# Patient Record
Sex: MALE | Race: OTHER | NOT HISPANIC OR LATINO
[De-identification: names, ages, dates, MRNs, and addresses within clinical notes are randomized per-mention and may not be internally consistent; named-entity substitution may affect disease eponyms.]

---

## 2017-01-03 ENCOUNTER — TRANSCRIPTION ENCOUNTER (OUTPATIENT)
Age: 82
End: 2017-01-03

## 2017-01-27 ENCOUNTER — FORM ENCOUNTER (OUTPATIENT)
Age: 82
End: 2017-01-27

## 2017-01-29 ENCOUNTER — FORM ENCOUNTER (OUTPATIENT)
Age: 82
End: 2017-01-29

## 2017-01-31 ENCOUNTER — NON-APPOINTMENT (OUTPATIENT)
Age: 82
End: 2017-01-31

## 2017-01-31 ENCOUNTER — APPOINTMENT (OUTPATIENT)
Dept: ELECTROPHYSIOLOGY | Facility: CLINIC | Age: 82
End: 2017-01-31

## 2017-01-31 VITALS
HEIGHT: 67 IN | WEIGHT: 181 LBS | BODY MASS INDEX: 28.41 KG/M2 | SYSTOLIC BLOOD PRESSURE: 165 MMHG | DIASTOLIC BLOOD PRESSURE: 78 MMHG | HEART RATE: 64 BPM

## 2017-03-20 ENCOUNTER — RX RENEWAL (OUTPATIENT)
Age: 82
End: 2017-03-20

## 2017-03-23 ENCOUNTER — NON-APPOINTMENT (OUTPATIENT)
Age: 82
End: 2017-03-23

## 2017-03-23 ENCOUNTER — APPOINTMENT (OUTPATIENT)
Dept: CARDIOLOGY | Facility: CLINIC | Age: 82
End: 2017-03-23

## 2017-03-23 VITALS
DIASTOLIC BLOOD PRESSURE: 75 MMHG | BODY MASS INDEX: 28.56 KG/M2 | WEIGHT: 182 LBS | HEART RATE: 55 BPM | OXYGEN SATURATION: 96 % | SYSTOLIC BLOOD PRESSURE: 138 MMHG | HEIGHT: 67 IN

## 2017-04-03 ENCOUNTER — RX RENEWAL (OUTPATIENT)
Age: 82
End: 2017-04-03

## 2017-07-12 ENCOUNTER — APPOINTMENT (OUTPATIENT)
Dept: CARDIOLOGY | Facility: CLINIC | Age: 82
End: 2017-07-12

## 2017-07-12 ENCOUNTER — NON-APPOINTMENT (OUTPATIENT)
Age: 82
End: 2017-07-12

## 2017-07-12 VITALS
OXYGEN SATURATION: 95 % | DIASTOLIC BLOOD PRESSURE: 74 MMHG | HEART RATE: 55 BPM | WEIGHT: 182 LBS | BODY MASS INDEX: 28.56 KG/M2 | HEIGHT: 67 IN | SYSTOLIC BLOOD PRESSURE: 147 MMHG

## 2017-07-25 ENCOUNTER — APPOINTMENT (OUTPATIENT)
Dept: ELECTROPHYSIOLOGY | Facility: CLINIC | Age: 82
End: 2017-07-25

## 2017-07-25 ENCOUNTER — NON-APPOINTMENT (OUTPATIENT)
Age: 82
End: 2017-07-25

## 2017-07-25 VITALS
WEIGHT: 182 LBS | HEIGHT: 67 IN | HEART RATE: 58 BPM | OXYGEN SATURATION: 97 % | DIASTOLIC BLOOD PRESSURE: 72 MMHG | SYSTOLIC BLOOD PRESSURE: 165 MMHG | BODY MASS INDEX: 28.56 KG/M2

## 2017-07-31 ENCOUNTER — NON-APPOINTMENT (OUTPATIENT)
Age: 82
End: 2017-07-31

## 2017-07-31 ENCOUNTER — MEDICATION RENEWAL (OUTPATIENT)
Age: 82
End: 2017-07-31

## 2017-10-18 ENCOUNTER — APPOINTMENT (OUTPATIENT)
Dept: CARDIOLOGY | Facility: CLINIC | Age: 82
End: 2017-10-18
Payer: MEDICARE

## 2017-10-18 ENCOUNTER — NON-APPOINTMENT (OUTPATIENT)
Age: 82
End: 2017-10-18

## 2017-10-18 VITALS — HEART RATE: 54 BPM | OXYGEN SATURATION: 96 % | DIASTOLIC BLOOD PRESSURE: 70 MMHG | SYSTOLIC BLOOD PRESSURE: 149 MMHG

## 2017-10-18 PROCEDURE — 99214 OFFICE O/P EST MOD 30 MIN: CPT

## 2017-10-18 PROCEDURE — 93000 ELECTROCARDIOGRAM COMPLETE: CPT

## 2017-11-24 ENCOUNTER — RX RENEWAL (OUTPATIENT)
Age: 82
End: 2017-11-24

## 2018-01-30 ENCOUNTER — APPOINTMENT (OUTPATIENT)
Dept: ELECTROPHYSIOLOGY | Facility: CLINIC | Age: 83
End: 2018-01-30

## 2018-03-01 ENCOUNTER — NON-APPOINTMENT (OUTPATIENT)
Age: 83
End: 2018-03-01

## 2018-03-01 ENCOUNTER — APPOINTMENT (OUTPATIENT)
Dept: ELECTROPHYSIOLOGY | Facility: CLINIC | Age: 83
End: 2018-03-01
Payer: MEDICARE

## 2018-03-01 VITALS
DIASTOLIC BLOOD PRESSURE: 69 MMHG | BODY MASS INDEX: 28.72 KG/M2 | WEIGHT: 183 LBS | SYSTOLIC BLOOD PRESSURE: 128 MMHG | HEART RATE: 60 BPM | OXYGEN SATURATION: 97 % | HEIGHT: 67 IN

## 2018-03-01 DIAGNOSIS — R07.9 CHEST PAIN, UNSPECIFIED: ICD-10-CM

## 2018-03-01 PROCEDURE — 93000 ELECTROCARDIOGRAM COMPLETE: CPT

## 2018-03-01 PROCEDURE — 99214 OFFICE O/P EST MOD 30 MIN: CPT

## 2018-04-04 ENCOUNTER — RX RENEWAL (OUTPATIENT)
Age: 83
End: 2018-04-04

## 2018-04-11 ENCOUNTER — APPOINTMENT (OUTPATIENT)
Dept: CV DIAGNOSTICS | Facility: HOSPITAL | Age: 83
End: 2018-04-11

## 2018-04-18 ENCOUNTER — APPOINTMENT (OUTPATIENT)
Dept: CARDIOLOGY | Facility: CLINIC | Age: 83
End: 2018-04-18
Payer: MEDICARE

## 2018-04-18 ENCOUNTER — NON-APPOINTMENT (OUTPATIENT)
Age: 83
End: 2018-04-18

## 2018-04-18 VITALS — DIASTOLIC BLOOD PRESSURE: 65 MMHG | OXYGEN SATURATION: 96 % | HEART RATE: 78 BPM | SYSTOLIC BLOOD PRESSURE: 113 MMHG

## 2018-04-18 PROCEDURE — 93000 ELECTROCARDIOGRAM COMPLETE: CPT

## 2018-04-18 PROCEDURE — 99215 OFFICE O/P EST HI 40 MIN: CPT

## 2018-04-18 RX ORDER — VALSARTAN 160 MG/1
160 TABLET, COATED ORAL
Qty: 30 | Refills: 0 | Status: COMPLETED | COMMUNITY
Start: 2018-04-18

## 2018-05-03 RX ORDER — VALSARTAN 160 MG/1
160 TABLET, COATED ORAL
Qty: 90 | Refills: 1 | Status: ACTIVE | COMMUNITY
Start: 2018-05-02 | End: 1900-01-01

## 2018-05-25 ENCOUNTER — RX RENEWAL (OUTPATIENT)
Age: 83
End: 2018-05-25

## 2018-08-09 ENCOUNTER — OTHER (OUTPATIENT)
Age: 83
End: 2018-08-09

## 2018-08-28 ENCOUNTER — FORM ENCOUNTER (OUTPATIENT)
Age: 83
End: 2018-08-28

## 2018-08-31 ENCOUNTER — OTHER (OUTPATIENT)
Age: 83
End: 2018-08-31

## 2018-09-11 ENCOUNTER — NON-APPOINTMENT (OUTPATIENT)
Age: 83
End: 2018-09-11

## 2018-09-11 ENCOUNTER — APPOINTMENT (OUTPATIENT)
Dept: ELECTROPHYSIOLOGY | Facility: CLINIC | Age: 83
End: 2018-09-11
Payer: MEDICARE

## 2018-09-11 ENCOUNTER — APPOINTMENT (OUTPATIENT)
Dept: CV DIAGNOSITCS | Facility: HOSPITAL | Age: 83
End: 2018-09-11

## 2018-09-11 ENCOUNTER — OUTPATIENT (OUTPATIENT)
Dept: OUTPATIENT SERVICES | Facility: HOSPITAL | Age: 83
LOS: 1 days | End: 2018-09-11
Payer: MEDICARE

## 2018-09-11 VITALS
WEIGHT: 181 LBS | OXYGEN SATURATION: 98 % | BODY MASS INDEX: 28.35 KG/M2 | HEART RATE: 60 BPM | SYSTOLIC BLOOD PRESSURE: 121 MMHG | DIASTOLIC BLOOD PRESSURE: 62 MMHG

## 2018-09-11 DIAGNOSIS — I49.3 VENTRICULAR PREMATURE DEPOLARIZATION: ICD-10-CM

## 2018-09-11 DIAGNOSIS — I49.5 SICK SINUS SYNDROME: ICD-10-CM

## 2018-09-11 PROCEDURE — 93306 TTE W/DOPPLER COMPLETE: CPT

## 2018-09-11 PROCEDURE — 93306 TTE W/DOPPLER COMPLETE: CPT | Mod: 26

## 2018-09-11 PROCEDURE — 99213 OFFICE O/P EST LOW 20 MIN: CPT

## 2018-09-11 PROCEDURE — 93000 ELECTROCARDIOGRAM COMPLETE: CPT

## 2018-09-25 ENCOUNTER — OTHER (OUTPATIENT)
Age: 83
End: 2018-09-25

## 2018-10-10 ENCOUNTER — APPOINTMENT (OUTPATIENT)
Dept: CARDIOLOGY | Facility: CLINIC | Age: 83
End: 2018-10-10
Payer: MEDICARE

## 2018-10-10 ENCOUNTER — NON-APPOINTMENT (OUTPATIENT)
Age: 83
End: 2018-10-10

## 2018-10-10 VITALS — OXYGEN SATURATION: 96 % | HEART RATE: 68 BPM | DIASTOLIC BLOOD PRESSURE: 77 MMHG | SYSTOLIC BLOOD PRESSURE: 135 MMHG

## 2018-10-10 PROCEDURE — 99214 OFFICE O/P EST MOD 30 MIN: CPT

## 2018-10-10 PROCEDURE — 93000 ELECTROCARDIOGRAM COMPLETE: CPT

## 2018-11-23 ENCOUNTER — RX RENEWAL (OUTPATIENT)
Age: 83
End: 2018-11-23

## 2018-12-13 ENCOUNTER — APPOINTMENT (OUTPATIENT)
Dept: CARDIOLOGY | Facility: CLINIC | Age: 83
End: 2018-12-13

## 2018-12-14 ENCOUNTER — RX RENEWAL (OUTPATIENT)
Age: 83
End: 2018-12-14

## 2019-01-01 ENCOUNTER — NON-APPOINTMENT (OUTPATIENT)
Age: 84
End: 2019-01-01

## 2019-01-01 ENCOUNTER — APPOINTMENT (OUTPATIENT)
Dept: CARDIOLOGY | Facility: CLINIC | Age: 84
End: 2019-01-01
Payer: MEDICARE

## 2019-01-01 ENCOUNTER — OUTPATIENT (OUTPATIENT)
Dept: OUTPATIENT SERVICES | Facility: HOSPITAL | Age: 84
LOS: 1 days | End: 2019-01-01
Payer: MEDICARE

## 2019-01-01 ENCOUNTER — APPOINTMENT (OUTPATIENT)
Dept: CT IMAGING | Facility: CLINIC | Age: 84
End: 2019-01-01
Payer: MEDICARE

## 2019-01-01 ENCOUNTER — RX RENEWAL (OUTPATIENT)
Age: 84
End: 2019-01-01

## 2019-01-01 ENCOUNTER — FORM ENCOUNTER (OUTPATIENT)
Age: 84
End: 2019-01-01

## 2019-01-01 ENCOUNTER — APPOINTMENT (OUTPATIENT)
Dept: PULMONOLOGY | Facility: CLINIC | Age: 84
End: 2019-01-01
Payer: MEDICARE

## 2019-01-01 ENCOUNTER — RESULT REVIEW (OUTPATIENT)
Age: 84
End: 2019-01-01

## 2019-01-01 ENCOUNTER — TRANSCRIPTION ENCOUNTER (OUTPATIENT)
Age: 84
End: 2019-01-01

## 2019-01-01 ENCOUNTER — APPOINTMENT (OUTPATIENT)
Dept: THORACIC SURGERY | Facility: HOSPITAL | Age: 84
End: 2019-01-01

## 2019-01-01 ENCOUNTER — APPOINTMENT (OUTPATIENT)
Dept: THORACIC SURGERY | Facility: CLINIC | Age: 84
End: 2019-01-01
Payer: MEDICARE

## 2019-01-01 ENCOUNTER — OTHER (OUTPATIENT)
Age: 84
End: 2019-01-01

## 2019-01-01 ENCOUNTER — OUTPATIENT (OUTPATIENT)
Dept: OUTPATIENT SERVICES | Facility: HOSPITAL | Age: 84
LOS: 1 days | Discharge: HOME | End: 2019-01-01
Payer: MEDICARE

## 2019-01-01 VITALS
BODY MASS INDEX: 28.68 KG/M2 | TEMPERATURE: 97.7 F | HEART RATE: 76 BPM | WEIGHT: 168 LBS | SYSTOLIC BLOOD PRESSURE: 139 MMHG | DIASTOLIC BLOOD PRESSURE: 79 MMHG | HEIGHT: 64 IN

## 2019-01-01 VITALS
OXYGEN SATURATION: 97 % | DIASTOLIC BLOOD PRESSURE: 76 MMHG | TEMPERATURE: 97 F | HEIGHT: 64 IN | SYSTOLIC BLOOD PRESSURE: 136 MMHG | RESPIRATION RATE: 16 BRPM | HEART RATE: 78 BPM | WEIGHT: 166.01 LBS

## 2019-01-01 VITALS
SYSTOLIC BLOOD PRESSURE: 103 MMHG | RESPIRATION RATE: 16 BRPM | DIASTOLIC BLOOD PRESSURE: 73 MMHG | HEART RATE: 56 BPM | OXYGEN SATURATION: 96 %

## 2019-01-01 VITALS
OXYGEN SATURATION: 93 % | SYSTOLIC BLOOD PRESSURE: 100 MMHG | RESPIRATION RATE: 14 BRPM | DIASTOLIC BLOOD PRESSURE: 60 MMHG | BODY MASS INDEX: 29.19 KG/M2 | HEIGHT: 64 IN | HEART RATE: 78 BPM | WEIGHT: 171 LBS

## 2019-01-01 VITALS
HEIGHT: 62 IN | DIASTOLIC BLOOD PRESSURE: 60 MMHG | OXYGEN SATURATION: 95 % | BODY MASS INDEX: 29.81 KG/M2 | SYSTOLIC BLOOD PRESSURE: 110 MMHG | RESPIRATION RATE: 17 BRPM | HEART RATE: 77 BPM | WEIGHT: 162 LBS

## 2019-01-01 VITALS
SYSTOLIC BLOOD PRESSURE: 143 MMHG | HEIGHT: 64 IN | HEART RATE: 68 BPM | DIASTOLIC BLOOD PRESSURE: 80 MMHG | OXYGEN SATURATION: 96 %

## 2019-01-01 VITALS
WEIGHT: 171 LBS | HEIGHT: 64 IN | DIASTOLIC BLOOD PRESSURE: 80 MMHG | OXYGEN SATURATION: 99 % | BODY MASS INDEX: 29.19 KG/M2 | SYSTOLIC BLOOD PRESSURE: 143 MMHG | HEART RATE: 70 BPM

## 2019-01-01 DIAGNOSIS — Z95.1 PRESENCE OF AORTOCORONARY BYPASS GRAFT: Chronic | ICD-10-CM

## 2019-01-01 DIAGNOSIS — J90 PLEURAL EFFUSION, NOT ELSEWHERE CLASSIFIED: ICD-10-CM

## 2019-01-01 DIAGNOSIS — Z98.890 OTHER SPECIFIED POSTPROCEDURAL STATES: Chronic | ICD-10-CM

## 2019-01-01 DIAGNOSIS — Z00.8 ENCOUNTER FOR OTHER GENERAL EXAMINATION: ICD-10-CM

## 2019-01-01 DIAGNOSIS — R22.2 LOCALIZED SWELLING, MASS AND LUMP, TRUNK: ICD-10-CM

## 2019-01-01 DIAGNOSIS — E78.5 HYPERLIPIDEMIA, UNSPECIFIED: ICD-10-CM

## 2019-01-01 DIAGNOSIS — R06.09 OTHER FORMS OF DYSPNEA: ICD-10-CM

## 2019-01-01 DIAGNOSIS — I47.2 VENTRICULAR TACHYCARDIA: ICD-10-CM

## 2019-01-01 PROCEDURE — 99214 OFFICE O/P EST MOD 30 MIN: CPT

## 2019-01-01 PROCEDURE — 99215 OFFICE O/P EST HI 40 MIN: CPT | Mod: 25

## 2019-01-01 PROCEDURE — 99213 OFFICE O/P EST LOW 20 MIN: CPT

## 2019-01-01 PROCEDURE — 71250 CT THORAX DX C-: CPT | Mod: 26

## 2019-01-01 PROCEDURE — 94010 BREATHING CAPACITY TEST: CPT

## 2019-01-01 PROCEDURE — 71250 CT THORAX DX C-: CPT

## 2019-01-01 PROCEDURE — 99214 OFFICE O/P EST MOD 30 MIN: CPT | Mod: 25

## 2019-01-01 PROCEDURE — 31623 DX BRONCHOSCOPE/BRUSH: CPT

## 2019-01-01 PROCEDURE — 88112 CYTOPATH CELL ENHANCE TECH: CPT | Mod: 26

## 2019-01-01 PROCEDURE — 93000 ELECTROCARDIOGRAM COMPLETE: CPT

## 2019-01-01 PROCEDURE — 88305 TISSUE EXAM BY PATHOLOGIST: CPT | Mod: 26

## 2019-01-01 RX ORDER — DESLORATADINE 5 MG/1
5 TABLET ORAL DAILY
Qty: 90 | Refills: 1 | Status: ACTIVE | COMMUNITY
Start: 2019-01-01 | End: 1900-01-01

## 2019-01-01 RX ORDER — ALBUTEROL SULFATE 90 UG/1
108 (90 BASE) AEROSOL, METERED RESPIRATORY (INHALATION) EVERY 6 HOURS
Qty: 3 | Refills: 1 | Status: ACTIVE | COMMUNITY
Start: 2019-01-01 | End: 1900-01-01

## 2019-01-01 RX ORDER — FLUTICASONE FUROATE, UMECLIDINIUM BROMIDE AND VILANTEROL TRIFENATATE 100; 62.5; 25 UG/1; UG/1; UG/1
100-62.5-25 POWDER RESPIRATORY (INHALATION)
Qty: 3 | Refills: 1 | Status: ACTIVE | COMMUNITY
Start: 2019-01-15 | End: 1900-01-01

## 2019-01-01 RX ADMIN — SODIUM CHLORIDE 30 MILLILITER(S): 9 INJECTION, SOLUTION INTRAVENOUS at 07:09

## 2019-01-03 ENCOUNTER — NON-APPOINTMENT (OUTPATIENT)
Age: 84
End: 2019-01-03

## 2019-01-03 ENCOUNTER — OUTPATIENT (OUTPATIENT)
Dept: OUTPATIENT SERVICES | Facility: HOSPITAL | Age: 84
LOS: 1 days | End: 2019-01-03
Payer: MEDICARE

## 2019-01-03 ENCOUNTER — APPOINTMENT (OUTPATIENT)
Dept: CARDIOLOGY | Facility: CLINIC | Age: 84
End: 2019-01-03
Payer: MEDICARE

## 2019-01-03 VITALS
WEIGHT: 170 LBS | OXYGEN SATURATION: 96 % | DIASTOLIC BLOOD PRESSURE: 84 MMHG | SYSTOLIC BLOOD PRESSURE: 145 MMHG | HEIGHT: 67 IN | HEART RATE: 70 BPM | BODY MASS INDEX: 26.68 KG/M2

## 2019-01-03 DIAGNOSIS — I25.10 ATHEROSCLEROTIC HEART DISEASE OF NATIVE CORONARY ARTERY WITHOUT ANGINA PECTORIS: ICD-10-CM

## 2019-01-03 DIAGNOSIS — I10 ESSENTIAL (PRIMARY) HYPERTENSION: ICD-10-CM

## 2019-01-03 DIAGNOSIS — R06.09 OTHER FORMS OF DYSPNEA: ICD-10-CM

## 2019-01-03 PROCEDURE — 71046 X-RAY EXAM CHEST 2 VIEWS: CPT

## 2019-01-03 PROCEDURE — 93000 ELECTROCARDIOGRAM COMPLETE: CPT

## 2019-01-03 PROCEDURE — 99214 OFFICE O/P EST MOD 30 MIN: CPT

## 2019-01-03 PROCEDURE — 71046 X-RAY EXAM CHEST 2 VIEWS: CPT | Mod: 26

## 2019-01-04 ENCOUNTER — OTHER (OUTPATIENT)
Age: 84
End: 2019-01-04

## 2019-01-04 LAB
ALBUMIN SERPL ELPH-MCNC: 3.4 G/DL
ALP BLD-CCNC: 124 U/L
ALT SERPL-CCNC: 27 U/L
ANION GAP SERPL CALC-SCNC: 11 MMOL/L
AST SERPL-CCNC: 23 U/L
BILIRUB SERPL-MCNC: 0.5 MG/DL
BUN SERPL-MCNC: 23 MG/DL
CALCIUM SERPL-MCNC: 9.5 MG/DL
CHLORIDE SERPL-SCNC: 104 MMOL/L
CO2 SERPL-SCNC: 30 MMOL/L
CREAT SERPL-MCNC: 1.42 MG/DL
GLUCOSE SERPL-MCNC: 101 MG/DL
NT-PROBNP SERPL-MCNC: 2962 PG/ML
POTASSIUM SERPL-SCNC: 4.7 MMOL/L
PROT SERPL-MCNC: 6.7 G/DL
SODIUM SERPL-SCNC: 145 MMOL/L

## 2019-01-06 NOTE — PHYSICAL EXAM
[General Appearance - Well Developed] : well developed [Normal Appearance] : normal appearance [Well Groomed] : well groomed [General Appearance - Well Nourished] : well nourished [No Deformities] : no deformities [General Appearance - In No Acute Distress] : no acute distress [Normal Conjunctiva] : the conjunctiva exhibited no abnormalities [Eyelids - No Xanthelasma] : the eyelids demonstrated no xanthelasmas [Normal Oral Mucosa] : normal oral mucosa [No Oral Pallor] : no oral pallor [No Oral Cyanosis] : no oral cyanosis [Normal Jugular Venous A Waves Present] : normal jugular venous A waves present [Normal Jugular Venous V Waves Present] : normal jugular venous V waves present [No Jugular Venous Luna A Waves] : no jugular venous luna A waves [Respiration, Rhythm And Depth] : normal respiratory rhythm and effort [Heart Rate And Rhythm] : heart rate and rhythm were normal [Heart Sounds] : normal S1 and S2 [Murmurs] : no murmurs present [Edema] : no peripheral edema present [Regular-Premature Beats] : the rhythm was regular with premature beats [Abdomen Soft] : soft [Abdomen Tenderness] : non-tender [Abdomen Mass (___ Cm)] : no abdominal mass palpated [Abnormal Walk] : normal gait [Nail Clubbing] : no clubbing of the fingernails [Cyanosis, Localized] : no localized cyanosis [Petechial Hemorrhages (___cm)] : no petechial hemorrhages [Skin Color & Pigmentation] : normal skin color and pigmentation [] : no rash [No Venous Stasis] : no venous stasis [Skin Lesions] : no skin lesions [No Skin Ulcers] : no skin ulcer [No Xanthoma] : no  xanthoma was observed [Oriented To Time, Place, And Person] : oriented to person, place, and time [Affect] : the affect was normal [Mood] : the mood was normal [No Anxiety] : not feeling anxious [FreeTextEntry1] : trace edema

## 2019-01-06 NOTE — REVIEW OF SYSTEMS
[Feeling Fatigued] : feeling fatigued [Joint Pain] : joint pain [Joint Stiffness] : joint stiffness [Muscle Cramps] : muscle cramps [Shortness Of Breath] : no shortness of breath [Dyspnea on exertion] : not dyspnea during exertion [Lower Ext Edema] : no extremity edema [Palpitations] : no palpitations

## 2019-01-06 NOTE — HISTORY OF PRESENT ILLNESS
[FreeTextEntry1] : Misael is returning after taking diuretics for an additional week (week of Christmas)\par Now returns with continued BAGLEY\par No CP\par No LE edema\par (+) orthopnea\par No palpitations\par No syncope\par No fever\par

## 2019-01-06 NOTE — REASON FOR VISIT
[Follow-Up - Clinic] : a clinic follow-up of [Coronary Artery Disease] : coronary artery disease [Hyperlipidemia] : hyperlipidemia [Hypertension] : hypertension [Medication Management] : Medication management [Supraventricular Tachycardia] : supraventricular tachycardia [Ventricular Tachycardia] : ventricular tachycardia

## 2019-01-08 ENCOUNTER — OUTPATIENT (OUTPATIENT)
Dept: OUTPATIENT SERVICES | Facility: HOSPITAL | Age: 84
LOS: 1 days | Discharge: HOME | End: 2019-01-08
Payer: MEDICARE

## 2019-01-08 DIAGNOSIS — I10 ESSENTIAL (PRIMARY) HYPERTENSION: ICD-10-CM

## 2019-01-08 DIAGNOSIS — I25.10 ATHEROSCLEROTIC HEART DISEASE OF NATIVE CORONARY ARTERY WITHOUT ANGINA PECTORIS: ICD-10-CM

## 2019-01-08 DIAGNOSIS — R06.09 OTHER FORMS OF DYSPNEA: ICD-10-CM

## 2019-01-08 PROCEDURE — 93306 TTE W/DOPPLER COMPLETE: CPT | Mod: 26

## 2019-01-14 ENCOUNTER — APPOINTMENT (OUTPATIENT)
Dept: CV DIAGNOSITCS | Facility: HOSPITAL | Age: 84
End: 2019-01-14

## 2019-01-15 ENCOUNTER — APPOINTMENT (OUTPATIENT)
Dept: PULMONOLOGY | Facility: CLINIC | Age: 84
End: 2019-01-15
Payer: MEDICARE

## 2019-01-15 ENCOUNTER — APPOINTMENT (OUTPATIENT)
Dept: CT IMAGING | Facility: CLINIC | Age: 84
End: 2019-01-15

## 2019-01-15 ENCOUNTER — NON-APPOINTMENT (OUTPATIENT)
Age: 84
End: 2019-01-15

## 2019-01-15 VITALS
RESPIRATION RATE: 17 BRPM | WEIGHT: 171 LBS | HEART RATE: 79 BPM | DIASTOLIC BLOOD PRESSURE: 70 MMHG | OXYGEN SATURATION: 97 % | SYSTOLIC BLOOD PRESSURE: 130 MMHG | BODY MASS INDEX: 30.3 KG/M2 | HEIGHT: 63 IN

## 2019-01-15 DIAGNOSIS — Z87.891 PERSONAL HISTORY OF NICOTINE DEPENDENCE: ICD-10-CM

## 2019-01-15 PROCEDURE — 94727 GAS DIL/WSHOT DETER LNG VOL: CPT

## 2019-01-15 PROCEDURE — 94618 PULMONARY STRESS TESTING: CPT

## 2019-01-15 PROCEDURE — 99205 OFFICE O/P NEW HI 60 MIN: CPT | Mod: 25

## 2019-01-15 PROCEDURE — 71046 X-RAY EXAM CHEST 2 VIEWS: CPT

## 2019-01-15 PROCEDURE — 94060 EVALUATION OF WHEEZING: CPT | Mod: 59

## 2019-01-15 PROCEDURE — 94729 DIFFUSING CAPACITY: CPT

## 2019-01-15 RX ORDER — AZELASTINE HYDROCHLORIDE 205.5 UG/1
0.15 SPRAY, METERED NASAL TWICE DAILY
Qty: 3 | Refills: 1 | Status: ACTIVE | COMMUNITY
Start: 2019-01-15 | End: 1900-01-01

## 2019-01-15 NOTE — ADDENDUM
[FreeTextEntry1] : Documented by Luis Moore acting as a scribe for Dr. Simeon Jarquin on 1/15/2019\par \par All medical record entries made by the Scribe were at my, Dr. Simeon Jarquin's, direction and personally dictated by me on 1/15/2019. I have reviewed the chart and agree that the record accurately reflects my personal performance of the history, physical exam, assessment and plan. I have also personally directed, reviewed, and agree with the discharge instructions. \par \par \par \par \par

## 2019-01-15 NOTE — PHYSICAL EXAM
[General Appearance - Well Developed] : well developed [Normal Appearance] : normal appearance [Well Groomed] : well groomed [General Appearance - Well Nourished] : well nourished [No Deformities] : no deformities [General Appearance - In No Acute Distress] : no acute distress [Normal Conjunctiva] : the conjunctiva exhibited no abnormalities [Eyelids - No Xanthelasma] : the eyelids demonstrated no xanthelasmas [Normal Oropharynx] : normal oropharynx [Neck Appearance] : the appearance of the neck was normal [Neck Cervical Mass (___cm)] : no neck mass was observed [Jugular Venous Distention Increased] : there was no jugular-venous distention [Thyroid Diffuse Enlargement] : the thyroid was not enlarged [Thyroid Nodule] : there were no palpable thyroid nodules [Heart Rate And Rhythm] : heart rate and rhythm were normal [Heart Sounds] : normal S1 and S2 [Murmurs] : no murmurs present [Respiration, Rhythm And Depth] : normal respiratory rhythm and effort [Exaggerated Use Of Accessory Muscles For Inspiration] : no accessory muscle use [Abdomen Soft] : soft [Abdomen Tenderness] : non-tender [Abdomen Mass (___ Cm)] : no abdominal mass palpated [Abnormal Walk] : normal gait [Gait - Sufficient For Exercise Testing] : the gait was sufficient for exercise testing [Nail Clubbing] : no clubbing of the fingernails [Cyanosis, Localized] : no localized cyanosis [Petechial Hemorrhages (___cm)] : no petechial hemorrhages [Skin Color & Pigmentation] : normal skin color and pigmentation [Skin Turgor] : normal skin turgor [] : no rash [Deep Tendon Reflexes (DTR)] : deep tendon reflexes were 2+ and symmetric [Sensation] : the sensory exam was normal to light touch and pinprick [No Focal Deficits] : no focal deficits [Oriented To Time, Place, And Person] : oriented to person, place, and time [Impaired Insight] : insight and judgment were intact [Affect] : the affect was normal [III] : III [FreeTextEntry1] : I:E 1:3, decreased breath left

## 2019-01-15 NOTE — ASSESSMENT
[FreeTextEntry1] : Ms. Nash is a 86 year old female with a prior history of  longstanding parotid tumor, CAD, s/p CABG (2007), coronary stents, HTN, NSVT, RA, HLD, and former smoker, likely COPD who now comes in for a pulmonary evaluation.\par \par The patient's SOB is felt to be multifactorial:\par - Out-of Shape\par - Poor breathing mechanics\par - Restrictive Dysfunction due to pleural effusion\par - COPD\par - Cardiac Disease \par \par Problem 1: COPD\par - Add Trelegy 1 inhalation QD\par -COPD is a progressive disease and although it can’t be cured , appropriate management can slow its progression, reduce frequency and severity of exacerbations, and improve symptoms and the patient quality of life. Hospitalizations are the greatest contributor to the total COPD costs and account for up to 87% of total COPD related costs. Exacerbations are the main cause of admissions and subsequently account for the 40-75% of COPD costs. Inhaled maintenance therapy reduces the incidence of exacerbations in patients with stable COPD. Incorrect inhaler use and nonadherence are major obstacles to achieving COPD treatment goals. Many COPD patients have challenges (impaired inhalation, limited dexterity, reduced cognition: that limit their ability to correctly use their COPD treatment devices resulting in reduced symptom control. Of most importance is smoking cessation and early intervention with respiratory illnesses and contemplation for pulmonary rehab to enhance quality of life.\par -Inhaler technique reviewed as well as oral hygiene techniques reviewed with patient. Avoidance of cold air, extremes of temperature, rescue inhaler should be used before exercise. Order of medication reviewed with patient. Recommended use of a cool mist humidifier in the bedroom. \par \par Problem 2: Abnormal PET/CT/ CXR (Left Lower Lung "Tumor" and Pleural Effusion)\par - Follow up CT scan of the chest with contrast\par - Following scan, he was referred for a CTS evaluation for biopsy of left lower lung abnormality and effusion\par - Script given for erly detection CDT lung testing \par \par Problem 3: Allergy/ PND\par - Add Astelin Nasal Spray 0.15 1 sniff up to BID\par Environmental measures for allergies were encouraged including mattress and pillow cover, air purifier, and environmental controls.\par \par Problem 4: ?OSAS\par - Due to his fatigue, snoring, elevated Mallampati class, cardiac issues, and witnessed apneic episodes, he is being recommended for a home sleep study. \par - Sleep apnea is associated with adverse clinical consequences which an affect most organ systems. Cardiovascular disease risk includes arrhythmias, atrial fibrillation, hypertension, coronary artery disease, and stroke. Metabolic disorders include diabetes type 2, non-alcoholic fatty liver disease. Mood disorder especially depression; and cognitive decline especially in the elderly. Associations with chronic reflux/Winchester’s esophagus some but not all inclusive. \par -Reasons include arousal consistent with hypopnea; respiratory events most prominent in REM sleep or supine position; therefore sleep staging and body position are important for accurate diagnosis and estimation of AHI. \par \par Problem 5: Cardiac Disease\par - Follow up with Dr. Niko Soto.\par \par Problem 6: Poor Breathing Mechanics \par - Proper breathing techniques were reviewed with an emphasis of exhalation. Patient instructed to breath in for 1 second and out for four seconds. Patient was encouraged to not talk while walking.\par \par Problem 7: Health maintenance \par -s/p flu shot \par -recommended strep pneumonia vaccines: Prevnar-13 vaccine, followed by Pneumo vaccine 23 one year following\par -recommended early intervention for URIs\par -recommended regular osteoporosis evaluations\par -recommended early dermatological evaluations\par -recommended after the age of 50 to the age of 70, colonoscopy every 5 years\par \par \par f/u in 6-8 weeks\par pt is encouraged to call or fax the office with any questions or concerns. \par Explained to the pt in full detail with demonstrations how to use the inhalers and inhaler hygiene. \par -Education provided to the PT regarding their visit and conditions.

## 2019-01-15 NOTE — HISTORY OF PRESENT ILLNESS
[FreeTextEntry1] : Ms. Nash is a 86 year old female coming into the office today for an initial evaluation. Her chief complaint is wheezing \par - He comes in stating that he has been wheezing.\par -His wife reports that he has always been coughing, wheezing, SOB\par - She states that it became an issue upon returning from a cruise. She feels that he contracted something while on the cruise\par - His daughter reports that he has been sent for multiple X-rays, which revealed a cloudiness on one of his lungs. He followed up with another pulmonologist. He had a aspiration biopsy performed, the results of all wee inconclusive with possible Hodgkins lymphoma markers\par - His subauricular mass \par - He reports SOB on exertion. His daughter states that he feels SOB upon minimal exertion. \par - His daughter reports that he recently had a significant loss of appetite and weight loss.  \par - He reports a isolated instance of SOB while on his back\par - His wife denies snoring, but notices that he wheezes\par - His wife also notes that he will sometimes gasp while sleeping. \par - His general energy level is moderate\par - A few weeks ago, he had some LE edema, which was not felt to be a blood clot. \par - His sinuses are leaky drippy, and congested on occasion\par - He  denies any headaches, nausea, vomiting, fever, chills, sweats, chest pain, chest pressure, diarrhea, constipation, dysphagia, myalgias, dizziness, leg pain, itchy eyes, itchy ears, heartburn, reflux, or sour taste in the mouth.

## 2019-01-15 NOTE — PROCEDURE
[FreeTextEntry1] : PFT- spi reveals moderate restrictive and mild obstructive dysfunction; FEV1 was 1.29L which is 56% of predicted- there was 47% improvement with bronchodilator at mid to low lung volumes; moderately reduced lung volumes; normal diffusion at 16.6, which is 106% of predicted; normal flow volume loop \par \par Patient completed a 6-minute walk/exercise study in which the Lowest Pulse Ox was 94%; there was evidence of somewhat severe dyspnea and fatigue. The patient walked  9 laps or 146.7 meters. Pt stopped as he felt weak.  \par \par CXR revealed mild cardiomegaly; moderate left pleural effusion\par \par \par CXR (1/3/2019) revealed a hazy opacity in the left lower lobe; ?PNA vs atelectasis; left pleural effusion \par \par He had a bronchoscopy performed at [facility] on 10/23/2018 (Dr. Avila), which revealed brushings, washings, lavage, FNA all negative for malignancy \par \par He had a PET/CT scan performed on 9/7/2018, which revealed FDG-avid left hilar mass; inferiorly along the left lower lobe bronchus suspicious for malignancy; low-grade, FDG avid small to moderate pleural effusion;  longstanding right 5.3 cm right parotid mass with heterogenous uptake.

## 2019-01-21 ENCOUNTER — FORM ENCOUNTER (OUTPATIENT)
Age: 84
End: 2019-01-21

## 2019-01-22 ENCOUNTER — OUTPATIENT (OUTPATIENT)
Dept: OUTPATIENT SERVICES | Facility: HOSPITAL | Age: 84
LOS: 1 days | End: 2019-01-22
Payer: MEDICARE

## 2019-01-22 ENCOUNTER — APPOINTMENT (OUTPATIENT)
Dept: CT IMAGING | Facility: CLINIC | Age: 84
End: 2019-01-22
Payer: MEDICARE

## 2019-01-22 DIAGNOSIS — Z00.8 ENCOUNTER FOR OTHER GENERAL EXAMINATION: ICD-10-CM

## 2019-01-22 PROCEDURE — 71260 CT THORAX DX C+: CPT | Mod: 26

## 2019-01-22 PROCEDURE — 71260 CT THORAX DX C+: CPT

## 2019-01-28 ENCOUNTER — APPOINTMENT (OUTPATIENT)
Dept: THORACIC SURGERY | Facility: CLINIC | Age: 84
End: 2019-01-28
Payer: MEDICARE

## 2019-01-28 VITALS
OXYGEN SATURATION: 98 % | BODY MASS INDEX: 27.47 KG/M2 | HEART RATE: 66 BPM | WEIGHT: 175 LBS | TEMPERATURE: 97.5 F | SYSTOLIC BLOOD PRESSURE: 130 MMHG | DIASTOLIC BLOOD PRESSURE: 71 MMHG | HEIGHT: 67 IN

## 2019-01-28 PROCEDURE — 99213 OFFICE O/P EST LOW 20 MIN: CPT

## 2019-01-28 NOTE — HISTORY OF PRESENT ILLNESS
[FreeTextEntry1] : Mr. Nash is an 86 year old male, former smoker, with hx of COPD found to have a PET-avid left hilar mass.  Patient complains of shortness of breath.  He is here to have the above findings evaluated.  He states his shortness of breath is progressively worsening  He denies chest pain.  He also denies any recent fever or chills.

## 2019-01-28 NOTE — PHYSICAL EXAM
[Neck Appearance] : the appearance of the neck was normal [Neck Cervical Mass (___cm)] : no neck mass was observed [] : no respiratory distress [Respiration, Rhythm And Depth] : normal respiratory rhythm and effort [Exaggerated Use Of Accessory Muscles For Inspiration] : no accessory muscle use [Examination Of The Chest] : the chest was normal in appearance [Chest Visual Inspection Thoracic Asymmetry] : no chest asymmetry [Bowel Sounds] : normal bowel sounds [Abdomen Soft] : soft [Abdomen Tenderness] : non-tender [Cervical Lymph Nodes Enlarged Posterior Bilaterally] : posterior cervical [Cervical Lymph Nodes Enlarged Anterior Bilaterally] : anterior cervical [Supraclavicular Lymph Nodes Enlarged Bilaterally] : supraclavicular [Cranial Nerves] : cranial nerves 2-12 were intact [Deep Tendon Reflexes (DTR)] : deep tendon reflexes were 2+ and symmetric [Sensation] : the sensory exam was normal to light touch and pinprick [Oriented To Time, Place, And Person] : oriented to person, place, and time

## 2019-01-28 NOTE — ASSESSMENT
[FreeTextEntry1] : Mr. Nash is has a left hilar mas with upper lobe narrowing and a loculated moderate pleural effusion.  He has a bronchoscopy performed by a pulmonologist a few months ago which was inconclusive.  The lesion is highly suspicious of carcinoma.  I have given him the option of another bronchoscopy or a diagnostic thoracentesis. I a have also offered a bronchoscopy with a VATS decortication with a pleural biopsy.  I have discussed the risks and benefits of these options in great detail.  He will discuss with his family and get back to me.

## 2019-01-28 NOTE — CONSULT LETTER
[Dear  ___] : Dear  [unfilled], [Consult Letter:] : I had the pleasure of evaluating your patient, [unfilled]. [Please see my note below.] : Please see my note below. [Consult Closing:] : Thank you very much for allowing me to participate in the care of this patient.  If you have any questions, please do not hesitate to contact me. [Sincerely,] : Sincerely, [DrWalter  ___] : Dr. ASTUDILLO [DrWalter ___] : Dr. ASTUDILLO [FreeTextEntry2] : Simeon Jarquin MD\par [address] [facility]\[place] [address]\par [phone] [FreeTextEntry3] : Jett Baker MD\par Director of Thoracic, [facility]\par Cardiovascular & Thoracic Surgery\par \par [facility] \par [address] [place] [address]\[place] [address]

## 2019-02-18 ENCOUNTER — APPOINTMENT (OUTPATIENT)
Dept: PULMONOLOGY | Facility: CLINIC | Age: 84
End: 2019-02-18
Payer: MEDICARE

## 2019-02-18 VITALS
SYSTOLIC BLOOD PRESSURE: 130 MMHG | HEART RATE: 75 BPM | WEIGHT: 172 LBS | HEIGHT: 64 IN | RESPIRATION RATE: 16 BRPM | OXYGEN SATURATION: 98 % | DIASTOLIC BLOOD PRESSURE: 60 MMHG | BODY MASS INDEX: 29.37 KG/M2

## 2019-02-18 PROCEDURE — 94060 EVALUATION OF WHEEZING: CPT

## 2019-02-18 PROCEDURE — 99214 OFFICE O/P EST MOD 30 MIN: CPT | Mod: 25

## 2019-02-18 NOTE — HISTORY OF PRESENT ILLNESS
[FreeTextEntry1] : Ms. Nash is a 86 year old female with a history of abnormal CT, COPD, BAGLEY, left pleural effusion, PND, snoring, and SOB coming into the office today for a follow up visit. Her chief complaint is pleural effusion. \par - He states that when it is damp outside he has orthopedic pain \par - His daughter reports that his heart rate has been elevated. \par - His daughter also states that his breathing has been relatively fine aside from when he is active. \par - He notes that he has been relatively constipated. \par - His wife reports that he has been having a lot of dry mouth and thirst. \par - He is eating well and his weight is stable \par - He has not been eating as much as he used to \par - In the last few months he has lost around 10 pounds. \par - He is reluctant to proceed with Dr. Baker and has expressed a desire to simply observe his condition. \par - He denies any headaches, nausea, vomiting, fever, chills, sweats, chest pain, chest pressure, palpitations, coughing, wheezing, fatigue, diarrhea, constipation, dysphagia, myalgias, dizziness, leg swelling, leg pain, itchy eyes, itchy ears, heartburn, reflux, or sour taste in the mouth. \par \par

## 2019-02-18 NOTE — ASSESSMENT
[FreeTextEntry1] : Ms. Nash is a 86 year old female with a prior history of  longstanding parotid tumor, CAD, s/p CABG (2007), coronary stents, HTN, NSVT, RA, HLD, and former smoker, COPD, and pleural disease who now comes in for a pulmonary re-evaluation.\par \par The patient's SOB is felt to be multifactorial:\par - Out-of Shape\par - Poor breathing mechanics\par - Restrictive Dysfunction due to pleural effusion\par - COPD\par - Cardiac Disease \par \par Problem 1: COPD (Improved)\par - Continue Trelegy 1 inhalation QD\par -COPD is a progressive disease and although it can’t be cured , appropriate management can slow its progression, reduce frequency and severity of exacerbations, and improve symptoms and the patient quality of life. Hospitalizations are the greatest contributor to the total COPD costs and account for up to 87% of total COPD related costs. Exacerbations are the main cause of admissions and subsequently account for the 40-75% of COPD costs. Inhaled maintenance therapy reduces the incidence of exacerbations in patients with stable COPD. Incorrect inhaler use and nonadherence are major obstacles to achieving COPD treatment goals. Many COPD patients have challenges (impaired inhalation, limited dexterity, reduced cognition: that limit their ability to correctly use their COPD treatment devices resulting in reduced symptom control. Of most importance is smoking cessation and early intervention with respiratory illnesses and contemplation for pulmonary rehab to enhance quality of life.\par -Inhaler technique reviewed as well as oral hygiene techniques reviewed with patient. Avoidance of cold air, extremes of temperature, rescue inhaler should be used before exercise. Order of medication reviewed with patient. Recommended use of a cool mist humidifier in the bedroom. \par \par Problem 2: Abnormal PET/CT/ CXR (Left Lower Lung "Tumor" and Pleural Effusion)\par - Follow up CT scan of the chest with contrast\par - Following scan, he had a CTS evaluation for biopsy of left lower lung abnormality and effusion with Dr. Baker (pt reluctant to proceed)\par - S/p Early detection CDT lung testing: Negative\par - Recommended [facility] pulmonary rehab\par -Reassess exercise limitation caused by breathlessness and fatigue and also provide a supportive environment in which patients can become active and engage in management of their health problems \par - Unclear as to how long  pleural fluid has been present and whether it will remain as it is. He will need to be observed and followed up with a CT scan in 2-3 months at which point it will be re-evaluated. He will need cardiac evaluation. \par \par Problem 3: Allergy/ PND\par - Continue Astelin Nasal Spray 0.15 1 sniff up to BID\par Environmental measures for allergies were encouraged including mattress and pillow cover, air purifier, and environmental controls.\par \par Problem 4: ?OSAS\par - Due to his fatigue, snoring, elevated Mallampati class, cardiac issues, and witnessed apneic episodes, he is being recommended for a home sleep study. \par - Sleep apnea is associated with adverse clinical consequences which an affect most organ systems. Cardiovascular disease risk includes arrhythmias, atrial fibrillation, hypertension, coronary artery disease, and stroke. Metabolic disorders include diabetes type 2, non-alcoholic fatty liver disease. Mood disorder especially depression; and cognitive decline especially in the elderly. Associations with chronic reflux/Winchester’s esophagus some but not all inclusive. \par -Reasons include arousal consistent with hypopnea; respiratory events most prominent in REM sleep or supine position; therefore sleep staging and body position are important for accurate diagnosis and estimation of AHI. \par \par Problem 5: Cardiac Disease\par - Follow up with Dr. Niko Soto.\par \par Problem 6: Poor Breathing Mechanics \par - Proper breathing techniques were reviewed with an emphasis of exhalation. Patient instructed to breath in for 1 second and out for four seconds. Patient was encouraged to not talk while walking.\par \par Problem 7: Health maintenance \par -s/p flu shot \par -recommended strep pneumonia vaccines: Prevnar-13 vaccine, followed by Pneumo vaccine 23 one year following\par -recommended early intervention for URIs\par -recommended regular osteoporosis evaluations\par -recommended early dermatological evaluations\par -recommended after the age of 50 to the age of 70, colonoscopy every 5 years\par \par \par f/u in 6-8 weeks\par pt is encouraged to call or fax the office with any questions or concerns. \par Explained to the pt in full detail with demonstrations how to use the inhalers and inhaler hygiene. \par -Education provided to the PT regarding their visit and conditions.

## 2019-02-18 NOTE — ADDENDUM
[FreeTextEntry1] : Documented by Luis Moore acting as a scribe for Dr. Simeon Jarquin on 2/18/2019\par \par All medical record entries made by the Scribe were at my, Dr. Simeon Jarquin's, direction and personally dictated by me on 2/18/2019. I have reviewed the chart and agree that the record accurately reflects my personal performance of the history, physical exam, assessment and plan. I have also personally directed, reviewed, and agree with the discharge instructions. \par \par \par \par \par

## 2019-02-18 NOTE — REASON FOR VISIT
[Follow-Up] : a follow-up visit [FreeTextEntry1] : abnormal CT, COPD, BAGLEY, left pleural effusion, PND, snoring, and SOB

## 2019-02-18 NOTE — PHYSICAL EXAM
[General Appearance - Well Developed] : well developed [Normal Appearance] : normal appearance [Well Groomed] : well groomed [General Appearance - Well Nourished] : well nourished [No Deformities] : no deformities [General Appearance - In No Acute Distress] : no acute distress [Normal Conjunctiva] : the conjunctiva exhibited no abnormalities [Eyelids - No Xanthelasma] : the eyelids demonstrated no xanthelasmas [Normal Oropharynx] : normal oropharynx [III] : III [Neck Appearance] : the appearance of the neck was normal [Neck Cervical Mass (___cm)] : no neck mass was observed [Jugular Venous Distention Increased] : there was no jugular-venous distention [Thyroid Diffuse Enlargement] : the thyroid was not enlarged [Thyroid Nodule] : there were no palpable thyroid nodules [Heart Rate And Rhythm] : heart rate and rhythm were normal [Heart Sounds] : normal S1 and S2 [Murmurs] : no murmurs present [Respiration, Rhythm And Depth] : normal respiratory rhythm and effort [Exaggerated Use Of Accessory Muscles For Inspiration] : no accessory muscle use [Abdomen Soft] : soft [Abdomen Tenderness] : non-tender [Abdomen Mass (___ Cm)] : no abdominal mass palpated [Abnormal Walk] : normal gait [Gait - Sufficient For Exercise Testing] : the gait was sufficient for exercise testing [Nail Clubbing] : no clubbing of the fingernails [Cyanosis, Localized] : no localized cyanosis [Petechial Hemorrhages (___cm)] : no petechial hemorrhages [Deep Tendon Reflexes (DTR)] : deep tendon reflexes were 2+ and symmetric [Sensation] : the sensory exam was normal to light touch and pinprick [No Focal Deficits] : no focal deficits [Oriented To Time, Place, And Person] : oriented to person, place, and time [Impaired Insight] : insight and judgment were intact [Affect] : the affect was normal [Skin Color & Pigmentation] : normal skin color and pigmentation [Skin Turgor] : normal skin turgor [] : no rash [Auscultation Breath Sounds / Voice Sounds] : lungs were clear to auscultation bilaterally [FreeTextEntry1] : I:E 1:3, clear

## 2019-02-18 NOTE — PROCEDURE
[FreeTextEntry1] : PFT- spi reveals mild to moderate restrictive dysfunction; FEV1 was 1.48L which is 64% of predicted; abnormal inspiratory limb.\par \par He had a Early CDT- Lung Test performed on 1/23/2019, which revealed no significant level of autoantibodies detected. \par \par He had a CT chest scan performed on 1/22/2019, which showed small to moderate left sided pleural effusion with basilar with left basilar adjacent atelectasis. There is partial left upper lobe collapse secondary to a mas in the left hilar/ infrahilar region approximately 1.9 cm. This appears grossly unchanged since 8/28/2018. Again seen is circumferential narrowing of the left lower lobe bronchus and partial obstruction of the left upper lobe bronchus with associated atelectasis. Overall, no change from prior. \par \par

## 2019-02-27 ENCOUNTER — MEDICATION RENEWAL (OUTPATIENT)
Age: 84
End: 2019-02-27

## 2019-02-28 ENCOUNTER — NON-APPOINTMENT (OUTPATIENT)
Age: 84
End: 2019-02-28

## 2019-02-28 ENCOUNTER — APPOINTMENT (OUTPATIENT)
Dept: CARDIOLOGY | Facility: CLINIC | Age: 84
End: 2019-02-28
Payer: MEDICARE

## 2019-02-28 VITALS
HEIGHT: 64 IN | HEART RATE: 75 BPM | DIASTOLIC BLOOD PRESSURE: 73 MMHG | SYSTOLIC BLOOD PRESSURE: 126 MMHG | OXYGEN SATURATION: 94 % | BODY MASS INDEX: 29.37 KG/M2 | WEIGHT: 172 LBS

## 2019-02-28 PROCEDURE — 93000 ELECTROCARDIOGRAM COMPLETE: CPT

## 2019-02-28 PROCEDURE — 99214 OFFICE O/P EST MOD 30 MIN: CPT

## 2019-03-01 ENCOUNTER — MEDICATION RENEWAL (OUTPATIENT)
Age: 84
End: 2019-03-01

## 2019-03-03 NOTE — HISTORY OF PRESENT ILLNESS
[FreeTextEntry1] : Misael is returning for f/u\par Dyspnea improved on imhalers\par \par No CP\par No LE edema\par No palpitations\par No syncope\par No fever\par

## 2019-03-08 ENCOUNTER — MEDICATION RENEWAL (OUTPATIENT)
Age: 84
End: 2019-03-08

## 2019-03-13 ENCOUNTER — OTHER (OUTPATIENT)
Age: 84
End: 2019-03-13

## 2019-03-13 RX ORDER — FUROSEMIDE 40 MG/1
40 TABLET ORAL
Refills: 5 | Status: ACTIVE | COMMUNITY
Start: 2019-01-04

## 2019-03-25 ENCOUNTER — APPOINTMENT (OUTPATIENT)
Dept: THORACIC SURGERY | Facility: CLINIC | Age: 84
End: 2019-03-25
Payer: MEDICARE

## 2019-03-25 VITALS
SYSTOLIC BLOOD PRESSURE: 125 MMHG | HEART RATE: 89 BPM | HEIGHT: 64 IN | WEIGHT: 172 LBS | DIASTOLIC BLOOD PRESSURE: 77 MMHG | BODY MASS INDEX: 29.37 KG/M2 | TEMPERATURE: 97.9 F

## 2019-03-25 PROCEDURE — 99213 OFFICE O/P EST LOW 20 MIN: CPT

## 2019-03-26 NOTE — CONSULT LETTER
[Dear  ___] : Dear  [unfilled], [Courtesy Letter:] : I had the pleasure of seeing your patient, [unfilled], in my office today. [Please see my note below.] : Please see my note below. [Consult Closing:] : Thank you very much for allowing me to participate in the care of this patient.  If you have any questions, please do not hesitate to contact me. [Sincerely,] : Sincerely, [FreeTextEntry2] : Simeon Jarquin MD [FreeTextEntry3] : Jett Baker MD\par Director of Thoracic, [facility]\par Cardiovascular & Thoracic Surgery\par \par [facility] \par [address] [place] [address]\[place] [address]

## 2019-03-26 NOTE — ASSESSMENT
[FreeTextEntry1] : Mr. Nash has abnormal findings on his cat scan of the chest.  Given his extensive medical history, the patient wanted to proceed with the least invasive method to help decipher a diagnosis.  Given those restrictions, I am recommending a Bronchoscopy.  He has had a previous bronchoscopy which was nondiagnostic.  I would try a repeat bronchoscopy to see if a diagnosis can be made.  He will be scheduled in one to two weeks.

## 2019-03-26 NOTE — HISTORY OF PRESENT ILLNESS
[FreeTextEntry1] : Mr. Nash is an 86 year old male, former smoker, with hx of COPD found to have a left hilar mas with upper lobe narrowing and a loculated moderate pleural effusion. He has a bronchoscopy performed by a pulmonologist a few months ago which was inconclusive. The lesion is highly suspicious of carcinoma. I have given him the option of another bronchoscopy or a diagnostic thoracentesis. I have also offered a bronchoscopy with a VATS decortication with a pleural biopsy. Patient was reluctant to proceed and he now presents to discuss options further.

## 2019-04-03 ENCOUNTER — OUTPATIENT (OUTPATIENT)
Dept: OUTPATIENT SERVICES | Facility: HOSPITAL | Age: 84
LOS: 1 days | End: 2019-04-03

## 2019-04-03 VITALS
HEART RATE: 71 BPM | RESPIRATION RATE: 16 BRPM | DIASTOLIC BLOOD PRESSURE: 70 MMHG | WEIGHT: 166.01 LBS | TEMPERATURE: 98 F | HEIGHT: 64 IN | OXYGEN SATURATION: 97 % | SYSTOLIC BLOOD PRESSURE: 140 MMHG

## 2019-04-03 DIAGNOSIS — R22.2 LOCALIZED SWELLING, MASS AND LUMP, TRUNK: ICD-10-CM

## 2019-04-03 DIAGNOSIS — I49.9 CARDIAC ARRHYTHMIA, UNSPECIFIED: ICD-10-CM

## 2019-04-03 DIAGNOSIS — I10 ESSENTIAL (PRIMARY) HYPERTENSION: ICD-10-CM

## 2019-04-03 DIAGNOSIS — I25.10 ATHEROSCLEROTIC HEART DISEASE OF NATIVE CORONARY ARTERY WITHOUT ANGINA PECTORIS: ICD-10-CM

## 2019-04-03 DIAGNOSIS — Z95.1 PRESENCE OF AORTOCORONARY BYPASS GRAFT: Chronic | ICD-10-CM

## 2019-04-03 DIAGNOSIS — Z98.890 OTHER SPECIFIED POSTPROCEDURAL STATES: Chronic | ICD-10-CM

## 2019-04-03 DIAGNOSIS — Z87.09 PERSONAL HISTORY OF OTHER DISEASES OF THE RESPIRATORY SYSTEM: ICD-10-CM

## 2019-04-03 LAB
ANION GAP SERPL CALC-SCNC: 14 MMO/L — SIGNIFICANT CHANGE UP (ref 7–14)
BUN SERPL-MCNC: 31 MG/DL — HIGH (ref 7–23)
CALCIUM SERPL-MCNC: 9.9 MG/DL — SIGNIFICANT CHANGE UP (ref 8.4–10.5)
CHLORIDE SERPL-SCNC: 101 MMOL/L — SIGNIFICANT CHANGE UP (ref 98–107)
CO2 SERPL-SCNC: 27 MMOL/L — SIGNIFICANT CHANGE UP (ref 22–31)
CREAT SERPL-MCNC: 1.44 MG/DL — HIGH (ref 0.5–1.3)
GLUCOSE SERPL-MCNC: 107 MG/DL — HIGH (ref 70–99)
HCT VFR BLD CALC: 38.9 % — LOW (ref 39–50)
HGB BLD-MCNC: 11.9 G/DL — LOW (ref 13–17)
MCHC RBC-ENTMCNC: 27 PG — SIGNIFICANT CHANGE UP (ref 27–34)
MCHC RBC-ENTMCNC: 30.6 % — LOW (ref 32–36)
MCV RBC AUTO: 88.4 FL — SIGNIFICANT CHANGE UP (ref 80–100)
NRBC # FLD: 0 K/UL — SIGNIFICANT CHANGE UP (ref 0–0)
PLATELET # BLD AUTO: 360 K/UL — SIGNIFICANT CHANGE UP (ref 150–400)
PMV BLD: 10.1 FL — SIGNIFICANT CHANGE UP (ref 7–13)
POTASSIUM SERPL-MCNC: 4 MMOL/L — SIGNIFICANT CHANGE UP (ref 3.5–5.3)
POTASSIUM SERPL-SCNC: 4 MMOL/L — SIGNIFICANT CHANGE UP (ref 3.5–5.3)
RBC # BLD: 4.4 M/UL — SIGNIFICANT CHANGE UP (ref 4.2–5.8)
RBC # FLD: 13.2 % — SIGNIFICANT CHANGE UP (ref 10.3–14.5)
SODIUM SERPL-SCNC: 142 MMOL/L — SIGNIFICANT CHANGE UP (ref 135–145)
WBC # BLD: 10.34 K/UL — SIGNIFICANT CHANGE UP (ref 3.8–10.5)
WBC # FLD AUTO: 10.34 K/UL — SIGNIFICANT CHANGE UP (ref 3.8–10.5)

## 2019-04-03 RX ORDER — SODIUM CHLORIDE 9 MG/ML
1000 INJECTION, SOLUTION INTRAVENOUS
Qty: 0 | Refills: 0 | Status: DISCONTINUED | OUTPATIENT
Start: 2019-01-01 | End: 2019-01-01

## 2019-04-03 NOTE — H&P PST ADULT - PRIMARY CARE PROVIDER
Dr Hollis                                                                                                         Dr Jarquin pulmonary  [phone]

## 2019-04-03 NOTE — H&P PST ADULT - NSICDXPASTMEDICALHX_GEN_ALL_CORE_FT
PAST MEDICAL HISTORY:  CAD (coronary artery disease)     DVT, lower extremity 2007, left leg    Dyspnea on exertion     Heart attack 2007    History of COPD     HLD (hyperlipidemia)     HTN (hypertension)     Mass of parotid gland x 50 years, Right    NSVT (nonsustained ventricular tachycardia)     Paroxysmal atrial fibrillation     Pleural effusion on left     Rheumatoid arthritis     Sinus node dysfunction     SVT (supraventricular tachycardia)

## 2019-04-03 NOTE — H&P PST ADULT - NSICDXPASTSURGICALHX_GEN_ALL_CORE_FT
PAST SURGICAL HISTORY:  H/O cardiac catheterization 2008 LCX    S/P CABG x 3 2007 PAST SURGICAL HISTORY:  H/O cardiac catheterization 2008 LCX stent    S/P CABG x 3 2007

## 2019-04-03 NOTE — H&P PST ADULT - HISTORY OF PRESENT ILLNESS
86 year old male presents to presurgical testing with diagnosis of localized swelling, mass and lump, trunk scheduled for flexible bronchoscopy for 4/16/19. Pt with Hx of 20 lb weight loss in 6 months, cough, and shortness of breath on exertion, found to have a left hilar mass with upper lobe narrowing and a left pleural effusion.

## 2019-04-03 NOTE — H&P PST ADULT - MUSCULOSKELETAL
details… detailed exam ROM intact/no joint swelling/normal strength/no joint warmth/no joint erythema/no calf tenderness

## 2019-04-03 NOTE — H&P PST ADULT - NSICDXPROBLEM_GEN_ALL_CORE_FT
PROBLEM DIAGNOSES  Problem: Localized swelling, mass and lump, trunk  Assessment and Plan:   Pt is scheduled for flexible bronchoscopy for 4/16/19. Pre-op instructions provided. Pepcid provided for GI prophylaxis. Pt stated understanding.  KEN precautions, OR booking notified     Problem: History of COPD  Assessment and Plan:   Instructed to take trelegy on the morning of procedure.  Last pulmonary note in chart.    Problem: Hypertension  Assessment and Plan:   Pt instructed to take atenolol on the morning of procedure    Problem: Arrhythmia  Assessment and Plan:   Pt instructed to take mexiletine on the morning of procedure    Problem: CAD (coronary artery disease)  Assessment and Plan:   Cardiac stent from 2008, and CABG x 3 in 2007, on aspirin, to continue.  Copy of cardiac evaluation on chart. PROBLEM DIAGNOSES  Problem: Localized swelling, mass and lump, trunk  Assessment and Plan:   Pt is scheduled for flexible bronchoscopy for 4/16/19. Pre-op instructions provided. Pepcid provided for GI prophylaxis. Pt stated understanding.  KEN precautions, OR booking notified.  Allergy to sulfa, OR booking notified.    Problem: History of COPD  Assessment and Plan:   Instructed to take trelegy on the morning of procedure.  Last pulmonary note in chart.    Problem: Hypertension  Assessment and Plan:   Pt instructed to take atenolol on the morning of procedure    Problem: Arrhythmia  Assessment and Plan:   Pt instructed to take mexiletine on the morning of procedure    Problem: CAD (coronary artery disease)  Assessment and Plan:   Cardiac stent from 2008, and CABG x 3 in 2007, on aspirin, to continue.  Copy of cardiac evaluation on chart.

## 2019-04-03 NOTE — H&P PST ADULT - RS GEN PE MLT RESP DETAILS PC
good air movement/respirations non-labored/clear to auscultation bilaterally/airway patent/diminished breath sounds, L

## 2019-04-03 NOTE — H&P PST ADULT - NEGATIVE OPHTHALMOLOGIC SYMPTOMS
no blurred vision L/no blurred vision R/no pain L/no diplopia/no photophobia/no loss of vision L/no pain R/no loss of vision R

## 2019-04-04 PROBLEM — I82.409 ACUTE EMBOLISM AND THROMBOSIS OF UNSPECIFIED DEEP VEINS OF UNSPECIFIED LOWER EXTREMITY: Chronic | Status: ACTIVE | Noted: 2019-04-03

## 2019-04-04 PROBLEM — I21.9 ACUTE MYOCARDIAL INFARCTION, UNSPECIFIED: Chronic | Status: ACTIVE | Noted: 2019-04-03

## 2019-04-04 PROBLEM — K11.9 DISEASE OF SALIVARY GLAND, UNSPECIFIED: Chronic | Status: ACTIVE | Noted: 2019-04-03

## 2019-04-12 PROBLEM — I47.2 VENTRICULAR TACHYCARDIA: Chronic | Status: ACTIVE | Noted: 2019-04-03

## 2019-04-12 PROBLEM — I48.0 PAROXYSMAL ATRIAL FIBRILLATION: Chronic | Status: ACTIVE | Noted: 2019-04-03

## 2019-04-12 PROBLEM — Z87.09 PERSONAL HISTORY OF OTHER DISEASES OF THE RESPIRATORY SYSTEM: Chronic | Status: ACTIVE | Noted: 2019-04-03

## 2019-04-12 PROBLEM — I47.1 SUPRAVENTRICULAR TACHYCARDIA: Chronic | Status: ACTIVE | Noted: 2019-04-03

## 2019-04-12 PROBLEM — E78.5 HYPERLIPIDEMIA, UNSPECIFIED: Chronic | Status: ACTIVE | Noted: 2019-04-03

## 2019-04-12 PROBLEM — I10 ESSENTIAL (PRIMARY) HYPERTENSION: Chronic | Status: ACTIVE | Noted: 2019-04-03

## 2019-04-12 PROBLEM — R06.09 OTHER FORMS OF DYSPNEA: Chronic | Status: ACTIVE | Noted: 2019-04-03

## 2019-04-12 PROBLEM — M06.9 RHEUMATOID ARTHRITIS, UNSPECIFIED: Chronic | Status: ACTIVE | Noted: 2019-04-03

## 2019-04-12 PROBLEM — J90 PLEURAL EFFUSION, NOT ELSEWHERE CLASSIFIED: Chronic | Status: ACTIVE | Noted: 2019-04-03

## 2019-04-12 PROBLEM — I25.10 ATHEROSCLEROTIC HEART DISEASE OF NATIVE CORONARY ARTERY WITHOUT ANGINA PECTORIS: Chronic | Status: ACTIVE | Noted: 2019-04-03

## 2019-04-12 PROBLEM — I49.5 SICK SINUS SYNDROME: Chronic | Status: ACTIVE | Noted: 2019-04-03

## 2019-04-16 NOTE — ASU DISCHARGE PLAN (ADULT/PEDIATRIC) - CARE PROVIDER_API CALL
Jett Baker)  Thoracic Surgery  [address] [place] [address]  Phone: [phone]  Fax: [phone]  Follow Up Time:

## 2019-04-16 NOTE — ASU DISCHARGE PLAN (ADULT/PEDIATRIC) - PROCEDURE
brochosocp[y and endobronchial brushing brochosocpy and endobronchial brushing brochoscopy and endobronchial brushing

## 2019-04-16 NOTE — ASU DISCHARGE PLAN (ADULT/PEDIATRIC) - CALL YOUR DOCTOR IF YOU HAVE ANY OF THE FOLLOWING:
Fever greater than (need to indicate Fahrenheit or Celsius)/shortness of breath, it is normal to cough up small amounts of blood tinged sputum/Bleeding that does not stop

## 2019-04-22 NOTE — ASSESSMENT
[FreeTextEntry1] : Mr. Nash's final pathology shows atypical cells in the bronchial brushings. I am recommending a thoracentesis.  The patient and his family will think about it and get back to me.

## 2019-04-22 NOTE — CONSULT LETTER
[Dear  ___] : Dear  [unfilled], [Courtesy Letter:] : I had the pleasure of seeing your patient, [unfilled], in my office today. [Please see my note below.] : Please see my note below. [Consult Closing:] : Thank you very much for allowing me to participate in the care of this patient.  If you have any questions, please do not hesitate to contact me. [Sincerely,] : Sincerely, [FreeTextEntry2] : Simeon Jarquin MD [FreeTextEntry3] : Jett Baker MD\par Director of Thoracic, [facility]\par Cardiovascular & Thoracic Surgery\par \par [facility] \par [address] [place] [address]\[place] [address]

## 2019-05-28 NOTE — ASSESSMENT
[FreeTextEntry1] : Ms. Nash is a 86 year old female with a prior history of  longstanding parotid tumor, CAD, s/p CABG (2007), coronary stents, HTN, NSVT, RA, HLD, and former smoker, COPD, and pleural disease who now comes in for a pulmonary re-evaluation.\par \par The patient's SOB is felt to be multifactorial:\par - Out-of Shape\par - Poor breathing mechanics\par - Restrictive Dysfunction due to pleural effusion\par - COPD\par - Cardiac Disease \par \par Problem 1: COPD (Improved)\par - Continue Trelegy 1 inhalation QD\par - Continue Ventolin 2 inhalations q6H\par -COPD is a progressive disease and although it can’t be cured , appropriate management can slow its progression, reduce frequency and severity of exacerbations, and improve symptoms and the patient quality of life. Hospitalizations are the greatest contributor to the total COPD costs and account for up to 87% of total COPD related costs. Exacerbations are the main cause of admissions and subsequently account for the 40-75% of COPD costs. Inhaled maintenance therapy reduces the incidence of exacerbations in patients with stable COPD. Incorrect inhaler use and nonadherence are major obstacles to achieving COPD treatment goals. Many COPD patients have challenges (impaired inhalation, limited dexterity, reduced cognition: that limit their ability to correctly use their COPD treatment devices resulting in reduced symptom control. Of most importance is smoking cessation and early intervention with respiratory illnesses and contemplation for pulmonary rehab to enhance quality of life.\par -Inhaler technique reviewed as well as oral hygiene techniques reviewed with patient. Avoidance of cold air, extremes of temperature, rescue inhaler should be used before exercise. Order of medication reviewed with patient. Recommended use of a cool mist humidifier in the bedroom. \par \par Problem 2: Abnormal PET/CT/ CXR (Left Lower Lung "Tumor" and Pleural Effusion)\par - Follow up CT scan of the chest with contrast\par - Following scan, he had a CTS evaluation for biopsy of left lower lung abnormality and effusion with Dr. Baker (pt reluctant to proceed)\par - S/p Early detection CDT lung testing: Negative\par -Reassess exercise limitation caused by breathlessness and fatigue and also provide a supportive environment in which patients can become active and engage in management of their health problems \par - Unclear as to how long  pleural fluid has been present and whether it will remain as it is. He will need to be observed and followed up with a CT scan 10/2019. He will need cardiac evaluation. \par \par Problem 2A: Pleural Effusion \par - Discussed with the pt and his family the benefits of undergoing a biopsy of his pleural effusion. They were provided a in-depth explanation of how the procedure is performed as well as the need for it. He was ultimately recommended to have a consultation with a radiologist, who would be performing the procedure. \par \par Problem 3: Allergy/ PND\par - Continue Astelin Nasal Spray 0.15 1 sniff up to BID\par - Add Clarinex 5 mg QHS\par Environmental measures for allergies were encouraged including mattress and pillow cover, air purifier, and environmental controls.\par \par Problem 4: ?OSAS\par - Due to his fatigue, snoring, elevated Mallampati class, cardiac issues, and witnessed apneic episodes, he is being recommended for a home sleep study. \par - Sleep apnea is associated with adverse clinical consequences which an affect most organ systems. Cardiovascular disease risk includes arrhythmias, atrial fibrillation, hypertension, coronary artery disease, and stroke. Metabolic disorders include diabetes type 2, non-alcoholic fatty liver disease. Mood disorder especially depression; and cognitive decline especially in the elderly. Associations with chronic reflux/Winchester’s esophagus some but not all inclusive. \par -Reasons include arousal consistent with hypopnea; respiratory events most prominent in REM sleep or supine position; therefore sleep staging and body position are important for accurate diagnosis and estimation of AHI. \par \par Problem 5: Cardiac Disease\par - Follow up with Dr. Niko Soto.\par \par Problem 6: Poor Breathing Mechanics \par - Proper breathing techniques were reviewed with an emphasis of exhalation. Patient instructed to breath in for 1 second and out for four seconds. Patient was encouraged to not talk while walking.\par \par Problem 7: Health maintenance \par -s/p flu shot \par - Recommended [facility] pulmonary rehab\par -recommended strep pneumonia vaccines: Prevnar-13 vaccine, followed by Pneumo vaccine 23 one year following\par -recommended early intervention for URIs\par -recommended regular osteoporosis evaluations\par -recommended early dermatological evaluations\par -recommended after the age of 50 to the age of 70, colonoscopy every 5 years\par \par \par f/u in 4 months with spi and DLCO\par pt is encouraged to call or fax the office with any questions or concerns. \par Explained to the pt in full detail with demonstrations how to use the inhalers and inhaler hygiene. \par -Education provided to the PT regarding their visit and conditions.

## 2019-05-28 NOTE — ADDENDUM
[FreeTextEntry1] : Documented by Luis Moore acting as a scribe for Dr. Simeon Jarquin on 5/28/2019\par \par All medical record entries made by the Scribe were at my, Dr. Simeon Jarquin's, direction and personally dictated by me on 5/28/2019. I have reviewed the chart and agree that the record accurately reflects my personal performance of the history, physical exam, assessment and plan. I have also personally directed, reviewed, and agree with the discharge instructions. \par \par \par \par \par

## 2019-05-28 NOTE — PHYSICAL EXAM
[Normal Appearance] : normal appearance [General Appearance - Well Developed] : well developed [General Appearance - Well Nourished] : well nourished [Well Groomed] : well groomed [General Appearance - In No Acute Distress] : no acute distress [No Deformities] : no deformities [Normal Conjunctiva] : the conjunctiva exhibited no abnormalities [Normal Oropharynx] : normal oropharynx [Eyelids - No Xanthelasma] : the eyelids demonstrated no xanthelasmas [III] : III [Neck Cervical Mass (___cm)] : no neck mass was observed [Neck Appearance] : the appearance of the neck was normal [Jugular Venous Distention Increased] : there was no jugular-venous distention [Thyroid Diffuse Enlargement] : the thyroid was not enlarged [Thyroid Nodule] : there were no palpable thyroid nodules [Heart Rate And Rhythm] : heart rate and rhythm were normal [Heart Sounds] : normal S1 and S2 [Murmurs] : no murmurs present [Exaggerated Use Of Accessory Muscles For Inspiration] : no accessory muscle use [Auscultation Breath Sounds / Voice Sounds] : lungs were clear to auscultation bilaterally [Respiration, Rhythm And Depth] : normal respiratory rhythm and effort [Kyphosis] : kyphosis [Abdomen Soft] : soft [Abdomen Tenderness] : non-tender [Abdomen Mass (___ Cm)] : no abdominal mass palpated [Gait - Sufficient For Exercise Testing] : the gait was sufficient for exercise testing [Nail Clubbing] : no clubbing of the fingernails [Cyanosis, Localized] : no localized cyanosis [Petechial Hemorrhages (___cm)] : no petechial hemorrhages [Deep Tendon Reflexes (DTR)] : deep tendon reflexes were 2+ and symmetric [Sensation] : the sensory exam was normal to light touch and pinprick [Oriented To Time, Place, And Person] : oriented to person, place, and time [No Focal Deficits] : no focal deficits [Impaired Insight] : insight and judgment were intact [Affect] : the affect was normal [] : no rash [Skin Color & Pigmentation] : normal skin color and pigmentation [Skin Turgor] : normal skin turgor [FreeTextEntry1] : Gait off

## 2019-05-28 NOTE — HISTORY OF PRESENT ILLNESS
[FreeTextEntry1] : Ms. Nash is a 87 year old female with a history of abnormal CT, COPD, BAGLEY, left pleural effusion, PND, snoring, and SOB coming into the office today for a follow up visit. Her chief complaint is \par - He comes in stating that he has been feeling okay but experienced a flair in his allergies yesterday due to heavy pollen exposure. He was unable to sleep comfortably. \par - He will occasionally get chills\par - He reports frequent constipation\par - He reports getting a sweet taste in his mouth following his inhalers, which he does not like. \par - His sleep has been unchanged since being discharged from the hospital. 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 Upon pollen exposure, his breathing is compromised.

## 2019-06-12 NOTE — REASON FOR VISIT
[Follow-Up - Clinic] : a clinic follow-up of [Hypertension] : hypertension [Coronary Artery Disease] : coronary artery disease [Hyperlipidemia] : hyperlipidemia [Medication Management] : Medication management [Ventricular Tachycardia] : ventricular tachycardia [Supraventricular Tachycardia] : supraventricular tachycardia

## 2019-06-13 NOTE — HISTORY OF PRESENT ILLNESS
[FreeTextEntry1] : Misael is returning for f/u\par Dyspnea improved on inhalers (at his baseline)\par \par No CP\par Mild LE edema\par No palpitations\par No syncope\par No fever\par

## 2019-06-13 NOTE — PHYSICAL EXAM
[General Appearance - Well Developed] : well developed [Normal Appearance] : normal appearance [Well Groomed] : well groomed [No Deformities] : no deformities [General Appearance - Well Nourished] : well nourished [Normal Conjunctiva] : the conjunctiva exhibited no abnormalities [General Appearance - In No Acute Distress] : no acute distress [Normal Oral Mucosa] : normal oral mucosa [No Oral Pallor] : no oral pallor [Eyelids - No Xanthelasma] : the eyelids demonstrated no xanthelasmas [Normal Jugular Venous A Waves Present] : normal jugular venous A waves present [Normal Jugular Venous V Waves Present] : normal jugular venous V waves present [No Oral Cyanosis] : no oral cyanosis [No Jugular Venous Luna A Waves] : no jugular venous luna A waves [Respiration, Rhythm And Depth] : normal respiratory rhythm and effort [Heart Rate And Rhythm] : heart rate and rhythm were normal [Heart Sounds] : normal S1 and S2 [Murmurs] : no murmurs present [Edema] : no peripheral edema present [Regular-Premature Beats] : the rhythm was regular with premature beats [Abdomen Soft] : soft [Abdomen Tenderness] : non-tender [Abdomen Mass (___ Cm)] : no abdominal mass palpated [Nail Clubbing] : no clubbing of the fingernails [Abnormal Walk] : normal gait [Cyanosis, Localized] : no localized cyanosis [Petechial Hemorrhages (___cm)] : no petechial hemorrhages [Skin Color & Pigmentation] : normal skin color and pigmentation [] : no rash [Skin Lesions] : no skin lesions [No Venous Stasis] : no venous stasis [No Xanthoma] : no  xanthoma was observed [No Skin Ulcers] : no skin ulcer [Affect] : the affect was normal [Mood] : the mood was normal [Oriented To Time, Place, And Person] : oriented to person, place, and time [No Anxiety] : not feeling anxious [FreeTextEntry1] : trace edema

## 2019-10-01 PROBLEM — J90 PLEURAL EFFUSION, LEFT: Status: ACTIVE | Noted: 2019-01-15

## 2019-10-01 NOTE — PHYSICAL EXAM
[General Appearance - Well Developed] : well developed [Normal Appearance] : normal appearance [Well Groomed] : well groomed [General Appearance - Well Nourished] : well nourished [No Deformities] : no deformities [General Appearance - In No Acute Distress] : no acute distress [Normal Conjunctiva] : the conjunctiva exhibited no abnormalities [Eyelids - No Xanthelasma] : the eyelids demonstrated no xanthelasmas [Normal Oropharynx] : normal oropharynx [III] : III [Neck Appearance] : the appearance of the neck was normal [Neck Cervical Mass (___cm)] : no neck mass was observed [Jugular Venous Distention Increased] : there was no jugular-venous distention [Thyroid Nodule] : there were no palpable thyroid nodules [Thyroid Diffuse Enlargement] : the thyroid was not enlarged [Heart Rate And Rhythm] : heart rate and rhythm were normal [Murmurs] : no murmurs present [Heart Sounds] : normal S1 and S2 [Respiration, Rhythm And Depth] : normal respiratory rhythm and effort [Exaggerated Use Of Accessory Muscles For Inspiration] : no accessory muscle use [Auscultation Breath Sounds / Voice Sounds] : lungs were clear to auscultation bilaterally [Kyphosis] : kyphosis [Abdomen Soft] : soft [Abdomen Tenderness] : non-tender [Abdomen Mass (___ Cm)] : no abdominal mass palpated [Gait - Sufficient For Exercise Testing] : the gait was sufficient for exercise testing [Nail Clubbing] : no clubbing of the fingernails [Petechial Hemorrhages (___cm)] : no petechial hemorrhages [Cyanosis, Localized] : no localized cyanosis [Deep Tendon Reflexes (DTR)] : deep tendon reflexes were 2+ and symmetric [Sensation] : the sensory exam was normal to light touch and pinprick [No Focal Deficits] : no focal deficits [Impaired Insight] : insight and judgment were intact [Oriented To Time, Place, And Person] : oriented to person, place, and time [Affect] : the affect was normal [Skin Color & Pigmentation] : normal skin color and pigmentation [Skin Turgor] : normal skin turgor [] : no rash [FreeTextEntry1] : I:E 1:3, clear

## 2019-10-01 NOTE — HISTORY OF PRESENT ILLNESS
[FreeTextEntry1] : Ms. Nash is a 87 year old female with a history of abnormal CT, COPD, BAGLEY, left pleural effusion, PND, snoring, and SOB coming into the office today for a follow up visit. Her chief complaint is COPD\par \par -Pt reports constipation\par -Pt has been doing exercises, but is restricted by his poor balance\par -Pt lost 10 lbs since June. His appetite has decreased \par -Pt reports sleeping at 11-12 and waking up at 4am to walk around the house. During the day, he naps. \par -Pt is bringing up mucus and is having a hard time clearing it. May be due to PND.\par -Pt is responding well to the antihistamine.\par -Pt has been drinking water with his medication at 11pm \par \par -he denies any headaches, nausea, vomiting, fever, chills, sweats, chest pain, chest pressure, diarrhea,  dysphagia, leg swelling, leg pain, itchy eyes, itchy ears, heartburn, reflux, or sour taste in the mouth.

## 2019-10-01 NOTE — PROCEDURE
[FreeTextEntry1] : PFT - spi reveals mild restriction; FEV1 is 1.24 which is 77% of predicted, normal flow volume loop \par

## 2019-10-01 NOTE — ASSESSMENT
[FreeTextEntry1] : Ms. Nash is a 87 year old female with a prior history of  longstanding parotid tumor, CAD, s/p CABG (2007), coronary stents, HTN, NSVT, RA, HLD, and former smoker, COPD, and pleural disease who now comes in for a pulmonary re-evaluation.\par \par The patient's SOB is felt to be multifactorial:\par - Out-of Shape\par - Poor breathing mechanics\par - Restrictive Dysfunction due to pleural effusion\par - COPD\par - Cardiac Disease \par \par Problem 1: COPD (Improved)\par - Continue Trelegy 1 inhalation QD\par - Continue Ventolin 2 inhalations q6H\par -recommended to use Acapella device\par -COPD is a progressive disease and although it can’t be cured , appropriate management can slow its progression, reduce frequency and severity of exacerbations, and improve symptoms and the patient quality of life. Hospitalizations are the greatest contributor to the total COPD costs and account for up to 87% of total COPD related costs. Exacerbations are the main cause of admissions and subsequently account for the 40-75% of COPD costs. Inhaled maintenance therapy reduces the incidence of exacerbations in patients with stable COPD. Incorrect inhaler use and nonadherence are major obstacles to achieving COPD treatment goals. Many COPD patients have challenges (impaired inhalation, limited dexterity, reduced cognition: that limit their ability to correctly use their COPD treatment devices resulting in reduced symptom control. Of most importance is smoking cessation and early intervention with respiratory illnesses and contemplation for pulmonary rehab to enhance quality of life.\par -Inhaler technique reviewed as well as oral hygiene techniques reviewed with patient. Avoidance of cold air, extremes of temperature, rescue inhaler should be used before exercise. Order of medication reviewed with patient. Recommended use of a cool mist humidifier in the bedroom. \par \par Problem 2: Abnormal PET/CT/ CXR (Left Lower Lung "Tumor" and Pleural Effusion)\par - Follow up CT scan of the chest with contrast 10/19\par - Following scan, he had a CTS evaluation for biopsy of left lower lung abnormality and effusion with Dr. Baker (pt reluctant to proceed)\par - S/p Early detection CDT lung testing: Negative\par -Reassess exercise limitation caused by breathlessness and fatigue and also provide a supportive environment in which patients can become active and engage in management of their health problems \par - Unclear as to how long  pleural fluid has been present and whether it will remain as it is. He will need to be observed and followed up with a CT scan 10/2019. He will need cardiac evaluation. \par \par Problem 2A: Pleural Effusion \par - Discussed with the pt and his family the benefits of undergoing a biopsy of his pleural effusion. They were provided a in-depth explanation of how the procedure is performed as well as the need for it. He was ultimately recommended to have a consultation with a radiologist, who would be performing the procedure. \par \par Problem 3: Allergy/ PND\par - Continue Astelin Nasal Spray 0.15 1 sniff up to BID\par - Continue Clarinex 5 mg QHS\par Environmental measures for allergies were encouraged including mattress and pillow cover, air purifier, and environmental controls.\par \par Problem 4: ?OSAS\par - Due to his fatigue, snoring, elevated Mallampati class, cardiac issues, and witnessed apneic episodes, he is being recommended for a home sleep study. \par - Sleep apnea is associated with adverse clinical consequences which an affect most organ systems. Cardiovascular disease risk includes arrhythmias, atrial fibrillation, hypertension, coronary artery disease, and stroke. Metabolic disorders include diabetes type 2, non-alcoholic fatty liver disease. Mood disorder especially depression; and cognitive decline especially in the elderly. Associations with chronic reflux/Winchester’s esophagus some but not all inclusive. \par -Reasons include arousal consistent with hypopnea; respiratory events most prominent in REM sleep or supine position; therefore sleep staging and body position are important for accurate diagnosis and estimation of AHI. \par \par Problem 5: Cardiac Disease\par - Follow up with Dr. Niko Soto.\par \par Problem 6: Poor Breathing Mechanics \par - Proper breathing techniques were reviewed with an emphasis of exhalation. Patient instructed to breath in for 1 second and out for four seconds. Patient was encouraged to not talk while walking.\par \par Problem 7: Health maintenance \par -s/p flu shot \par - Recommended [facility] pulmonary rehab\par -recommended strep pneumonia vaccines: Prevnar-13 vaccine, followed by Pneumo vaccine 23 one year following\par -recommended early intervention for URIs\par -recommended regular osteoporosis evaluations\par -recommended early dermatological evaluations\par -recommended after the age of 50 to the age of 70, colonoscopy every 5 years\par \par \par f/u in 4 months with spi and DLCO\par pt is encouraged to call or fax the office with any questions or concerns. \par Explained to the pt in full detail with demonstrations how to use the inhalers and inhaler hygiene. \par -Education provided to the PT regarding their visit and conditions.

## 2019-12-02 PROBLEM — R06.09 DYSPNEA ON EXERTION: Status: ACTIVE | Noted: 2017-07-23

## 2019-12-02 PROBLEM — I47.2 NSVT (NONSUSTAINED VENTRICULAR TACHYCARDIA): Status: ACTIVE | Noted: 2018-09-11

## 2019-12-02 NOTE — REASON FOR VISIT
[Follow-Up - Clinic] : a clinic follow-up of [Coronary Artery Disease] : coronary artery disease [Hyperlipidemia] : hyperlipidemia [Hypertension] : hypertension [Medication Management] : Medication management [Supraventricular Tachycardia] : supraventricular tachycardia [Ventricular Tachycardia] : ventricular tachycardia [Heart Failure] : congestive heart failure

## 2019-12-02 NOTE — REVIEW OF SYSTEMS
[Feeling Fatigued] : feeling fatigued [Joint Pain] : joint pain [Joint Stiffness] : joint stiffness [Muscle Cramps] : muscle cramps [Shortness Of Breath] : no shortness of breath [Lower Ext Edema] : no extremity edema [Dyspnea on exertion] : not dyspnea during exertion [Palpitations] : no palpitations

## 2019-12-02 NOTE — PHYSICAL EXAM
[General Appearance - Well Developed] : well developed [Normal Appearance] : normal appearance [Well Groomed] : well groomed [General Appearance - Well Nourished] : well nourished [No Deformities] : no deformities [General Appearance - In No Acute Distress] : no acute distress [Normal Conjunctiva] : the conjunctiva exhibited no abnormalities [Eyelids - No Xanthelasma] : the eyelids demonstrated no xanthelasmas [Normal Oral Mucosa] : normal oral mucosa [No Oral Pallor] : no oral pallor [No Oral Cyanosis] : no oral cyanosis [Normal Jugular Venous A Waves Present] : normal jugular venous A waves present [Normal Jugular Venous V Waves Present] : normal jugular venous V waves present [No Jugular Venous Luna A Waves] : no jugular venous luna A waves [Respiration, Rhythm And Depth] : normal respiratory rhythm and effort [Heart Rate And Rhythm] : heart rate and rhythm were normal [Heart Sounds] : normal S1 and S2 [Murmurs] : no murmurs present [Regular-Premature Beats] : the rhythm was regular with premature beats [Abdomen Soft] : soft [Abdomen Tenderness] : non-tender [Abdomen Mass (___ Cm)] : no abdominal mass palpated [Abnormal Walk] : normal gait [Nail Clubbing] : no clubbing of the fingernails [Cyanosis, Localized] : no localized cyanosis [Petechial Hemorrhages (___cm)] : no petechial hemorrhages [Skin Color & Pigmentation] : normal skin color and pigmentation [] : no rash [No Venous Stasis] : no venous stasis [Skin Lesions] : no skin lesions [No Skin Ulcers] : no skin ulcer [No Xanthoma] : no  xanthoma was observed [Oriented To Time, Place, And Person] : oriented to person, place, and time [Affect] : the affect was normal [Mood] : the mood was normal [No Anxiety] : not feeling anxious [FreeTextEntry1] : +1 edema b/l LE extremities

## 2019-12-02 NOTE — HISTORY OF PRESENT ILLNESS
[FreeTextEntry1] : Misael is returning for f/u \par \par No CP\par slightly worse LE edema\par No palpitations\par No syncope\par No fever\par

## 2020-01-01 ENCOUNTER — APPOINTMENT (OUTPATIENT)
Dept: PULMONOLOGY | Facility: CLINIC | Age: 85
End: 2020-01-01
Payer: MEDICARE

## 2020-01-01 ENCOUNTER — NON-APPOINTMENT (OUTPATIENT)
Age: 85
End: 2020-01-01

## 2020-01-01 ENCOUNTER — RESULT REVIEW (OUTPATIENT)
Age: 85
End: 2020-01-01

## 2020-01-01 ENCOUNTER — APPOINTMENT (OUTPATIENT)
Dept: CARDIOLOGY | Facility: CLINIC | Age: 85
End: 2020-01-01
Payer: MEDICARE

## 2020-01-01 ENCOUNTER — RX RENEWAL (OUTPATIENT)
Age: 85
End: 2020-01-01

## 2020-01-01 ENCOUNTER — APPOINTMENT (OUTPATIENT)
Dept: THORACIC SURGERY | Facility: CLINIC | Age: 85
End: 2020-01-01
Payer: MEDICARE

## 2020-01-01 ENCOUNTER — OUTPATIENT (OUTPATIENT)
Dept: INPATIENT UNIT | Facility: HOSPITAL | Age: 85
LOS: 1 days | Discharge: HOME | End: 2020-01-01
Payer: MEDICARE

## 2020-01-01 VITALS
OXYGEN SATURATION: 98 % | HEART RATE: 81 BPM | BODY MASS INDEX: 28.16 KG/M2 | DIASTOLIC BLOOD PRESSURE: 67 MMHG | WEIGHT: 153 LBS | TEMPERATURE: 97.7 F | RESPIRATION RATE: 17 BRPM | HEIGHT: 62 IN | SYSTOLIC BLOOD PRESSURE: 100 MMHG

## 2020-01-01 VITALS
SYSTOLIC BLOOD PRESSURE: 108 MMHG | HEIGHT: 62 IN | WEIGHT: 153 LBS | DIASTOLIC BLOOD PRESSURE: 65 MMHG | HEART RATE: 74 BPM | BODY MASS INDEX: 28.16 KG/M2 | OXYGEN SATURATION: 99 %

## 2020-01-01 VITALS
WEIGHT: 153 LBS | HEART RATE: 66 BPM | HEIGHT: 62 IN | OXYGEN SATURATION: 98 % | RESPIRATION RATE: 16 BRPM | DIASTOLIC BLOOD PRESSURE: 60 MMHG | TEMPERATURE: 98 F | SYSTOLIC BLOOD PRESSURE: 97 MMHG

## 2020-01-01 VITALS
OXYGEN SATURATION: 99 % | SYSTOLIC BLOOD PRESSURE: 110 MMHG | TEMPERATURE: 98 F | HEART RATE: 64 BPM | DIASTOLIC BLOOD PRESSURE: 58 MMHG | RESPIRATION RATE: 16 BRPM

## 2020-01-01 DIAGNOSIS — J90 PLEURAL EFFUSION, NOT ELSEWHERE CLASSIFIED: ICD-10-CM

## 2020-01-01 DIAGNOSIS — R06.83 SNORING: ICD-10-CM

## 2020-01-01 DIAGNOSIS — I49.3 VENTRICULAR PREMATURE DEPOLARIZATION: ICD-10-CM

## 2020-01-01 DIAGNOSIS — I25.10 ATHEROSCLEROTIC HEART DISEASE OF NATIVE CORONARY ARTERY W/OUT ANGINA PECTORIS: ICD-10-CM

## 2020-01-01 DIAGNOSIS — J44.9 CHRONIC OBSTRUCTIVE PULMONARY DISEASE, UNSPECIFIED: ICD-10-CM

## 2020-01-01 DIAGNOSIS — R06.02 SHORTNESS OF BREATH: ICD-10-CM

## 2020-01-01 DIAGNOSIS — I10 ESSENTIAL (PRIMARY) HYPERTENSION: ICD-10-CM

## 2020-01-01 DIAGNOSIS — J91.8 PLEURAL EFFUSION IN OTHER CONDITIONS CLASSIFIED ELSEWHERE: ICD-10-CM

## 2020-01-01 DIAGNOSIS — Z98.890 OTHER SPECIFIED POSTPROCEDURAL STATES: Chronic | ICD-10-CM

## 2020-01-01 DIAGNOSIS — Z95.1 PRESENCE OF AORTOCORONARY BYPASS GRAFT: Chronic | ICD-10-CM

## 2020-01-01 DIAGNOSIS — R91.8 OTHER NONSPECIFIC ABNORMAL FINDING OF LUNG FIELD: ICD-10-CM

## 2020-01-01 DIAGNOSIS — Z88.2 ALLERGY STATUS TO SULFONAMIDES: ICD-10-CM

## 2020-01-01 DIAGNOSIS — R09.82 POSTNASAL DRIP: ICD-10-CM

## 2020-01-01 LAB
ALBUMIN FLD-MCNC: 3.9 G/DL — SIGNIFICANT CHANGE UP
ANION GAP SERPL CALC-SCNC: 4 MMOL/L — LOW (ref 5–17)
B PERT IGG+IGM PNL SER: ABNORMAL
BUN SERPL-MCNC: 30 MG/DL — HIGH (ref 7–23)
CALCIUM SERPL-MCNC: 8.8 MG/DL — SIGNIFICANT CHANGE UP (ref 8.5–10.1)
CHLORIDE SERPL-SCNC: 106 MMOL/L — SIGNIFICANT CHANGE UP (ref 96–108)
CO2 SERPL-SCNC: 29 MMOL/L — SIGNIFICANT CHANGE UP (ref 22–31)
COLOR FLD: SIGNIFICANT CHANGE UP
CREAT SERPL-MCNC: 1.47 MG/DL — HIGH (ref 0.5–1.3)
CULTURE RESULTS: SIGNIFICANT CHANGE UP
FLUID INTAKE SUBSTANCE CLASS: SIGNIFICANT CHANGE UP
FLUID SEGMENTED GRANULOCYTES: 95 % — SIGNIFICANT CHANGE UP
GLUCOSE FLD-MCNC: <2 MG/DL — SIGNIFICANT CHANGE UP
GLUCOSE SERPL-MCNC: 111 MG/DL — HIGH (ref 70–99)
GRAM STN FLD: SIGNIFICANT CHANGE UP
HCT VFR BLD CALC: 37 % — LOW (ref 39–50)
HGB BLD-MCNC: 11.2 G/DL — LOW (ref 13–17)
INR BLD: 1.37 RATIO — HIGH (ref 0.88–1.16)
LDH SERPL L TO P-CCNC: 7066 U/L — SIGNIFICANT CHANGE UP
MCHC RBC-ENTMCNC: 24.9 PG — LOW (ref 27–34)
MCHC RBC-ENTMCNC: 30.3 GM/DL — LOW (ref 32–36)
MCV RBC AUTO: 82.4 FL — SIGNIFICANT CHANGE UP (ref 80–100)
MONOS+MACROS # FLD: 5 % — SIGNIFICANT CHANGE UP
NIGHT BLUE STAIN TISS: SIGNIFICANT CHANGE UP
PH FLD: 7.15 — SIGNIFICANT CHANGE UP
PLATELET # BLD AUTO: 399 K/UL — SIGNIFICANT CHANGE UP (ref 150–400)
POTASSIUM SERPL-MCNC: 4.4 MMOL/L — SIGNIFICANT CHANGE UP (ref 3.5–5.3)
POTASSIUM SERPL-SCNC: 4.4 MMOL/L — SIGNIFICANT CHANGE UP (ref 3.5–5.3)
PROT FLD-MCNC: 8.9 G/DL — SIGNIFICANT CHANGE UP
PROTHROM AB SERPL-ACNC: 15.4 SEC — HIGH (ref 10–12.9)
RBC # BLD: 4.49 M/UL — SIGNIFICANT CHANGE UP (ref 4.2–5.8)
RBC # FLD: 15.8 % — HIGH (ref 10.3–14.5)
RCV VOL RI: HIGH /UL (ref 0–0)
SODIUM SERPL-SCNC: 139 MMOL/L — SIGNIFICANT CHANGE UP (ref 135–145)
SPECIMEN SOURCE FLD: SIGNIFICANT CHANGE UP
SPECIMEN SOURCE: SIGNIFICANT CHANGE UP
TOTAL NUCLEATED CELL COUNT, BODY FLUID: 3915 /UL — SIGNIFICANT CHANGE UP
TUBE TYPE: SIGNIFICANT CHANGE UP
WBC # BLD: 10.36 K/UL — SIGNIFICANT CHANGE UP (ref 3.8–10.5)
WBC # FLD AUTO: 10.36 K/UL — SIGNIFICANT CHANGE UP (ref 3.8–10.5)

## 2020-01-01 PROCEDURE — 99442: CPT

## 2020-01-01 PROCEDURE — 71045 X-RAY EXAM CHEST 1 VIEW: CPT | Mod: 26

## 2020-01-01 PROCEDURE — 88108 CYTOPATH CONCENTRATE TECH: CPT | Mod: 26

## 2020-01-01 PROCEDURE — 85610 PROTHROMBIN TIME: CPT

## 2020-01-01 PROCEDURE — 80048 BASIC METABOLIC PNL TOTAL CA: CPT

## 2020-01-01 PROCEDURE — 36415 COLL VENOUS BLD VENIPUNCTURE: CPT

## 2020-01-01 PROCEDURE — 87075 CULTR BACTERIA EXCEPT BLOOD: CPT

## 2020-01-01 PROCEDURE — 88108 CYTOPATH CONCENTRATE TECH: CPT

## 2020-01-01 PROCEDURE — 84157 ASSAY OF PROTEIN OTHER: CPT

## 2020-01-01 PROCEDURE — 82945 GLUCOSE OTHER FLUID: CPT

## 2020-01-01 PROCEDURE — 99213 OFFICE O/P EST LOW 20 MIN: CPT

## 2020-01-01 PROCEDURE — 87206 SMEAR FLUORESCENT/ACID STAI: CPT

## 2020-01-01 PROCEDURE — 32555 ASPIRATE PLEURA W/ IMAGING: CPT | Mod: LT

## 2020-01-01 PROCEDURE — 87102 FUNGUS ISOLATION CULTURE: CPT

## 2020-01-01 PROCEDURE — 93010 ELECTROCARDIOGRAM REPORT: CPT

## 2020-01-01 PROCEDURE — 83986 ASSAY PH BODY FLUID NOS: CPT

## 2020-01-01 PROCEDURE — 87015 SPECIMEN INFECT AGNT CONCNTJ: CPT

## 2020-01-01 PROCEDURE — 88305 TISSUE EXAM BY PATHOLOGIST: CPT | Mod: 26

## 2020-01-01 PROCEDURE — 87070 CULTURE OTHR SPECIMN AEROBIC: CPT

## 2020-01-01 PROCEDURE — 89051 BODY FLUID CELL COUNT: CPT

## 2020-01-01 PROCEDURE — 93000 ELECTROCARDIOGRAM COMPLETE: CPT

## 2020-01-01 PROCEDURE — 83615 LACTATE (LD) (LDH) ENZYME: CPT

## 2020-01-01 PROCEDURE — C1729: CPT

## 2020-01-01 PROCEDURE — 85027 COMPLETE CBC AUTOMATED: CPT

## 2020-01-01 PROCEDURE — 88305 TISSUE EXAM BY PATHOLOGIST: CPT

## 2020-01-01 PROCEDURE — 71045 X-RAY EXAM CHEST 1 VIEW: CPT

## 2020-01-01 PROCEDURE — 87116 MYCOBACTERIA CULTURE: CPT

## 2020-01-01 PROCEDURE — 93005 ELECTROCARDIOGRAM TRACING: CPT

## 2020-01-01 PROCEDURE — 82042 OTHER SOURCE ALBUMIN QUAN EA: CPT

## 2020-01-01 RX ORDER — ATENOLOL 25 MG/1
1 TABLET ORAL
Qty: 0 | Refills: 0 | DISCHARGE

## 2020-01-01 RX ORDER — IPRATROPIUM BROMIDE AND ALBUTEROL SULFATE 2.5; .5 MG/3ML; MG/3ML
0.5-2.5 (3) SOLUTION RESPIRATORY (INHALATION) 4 TIMES DAILY
Qty: 120 | Refills: 2 | Status: ACTIVE | COMMUNITY
Start: 2020-01-01 | End: 1900-01-01

## 2020-01-01 RX ORDER — FLUTICASONE FUROATE, UMECLIDINIUM BROMIDE AND VILANTEROL TRIFENATATE 200; 62.5; 25 UG/1; UG/1; UG/1
1 POWDER RESPIRATORY (INHALATION)
Qty: 0 | Refills: 0 | DISCHARGE

## 2020-01-01 RX ORDER — BUDESONIDE 0.5 MG/2ML
0.5 INHALANT ORAL TWICE DAILY
Qty: 60 | Refills: 3 | Status: ACTIVE | COMMUNITY
Start: 2020-01-01 | End: 1900-01-01

## 2020-01-01 RX ORDER — DESLORATADINE 5 MG/1
1 TABLET, FILM COATED ORAL
Qty: 0 | Refills: 0 | DISCHARGE

## 2020-01-01 RX ORDER — AZELASTINE 137 UG/1
2 SPRAY, METERED NASAL
Qty: 0 | Refills: 0 | DISCHARGE

## 2020-01-01 RX ORDER — VALSARTAN 80 MG/1
1 TABLET ORAL
Qty: 0 | Refills: 0 | DISCHARGE

## 2020-01-01 RX ORDER — BLOOD-GLUCOSE METER
KIT MISCELLANEOUS
Qty: 1 | Refills: 0 | Status: ACTIVE | COMMUNITY
Start: 2020-01-01 | End: 1900-01-01

## 2020-01-01 RX ORDER — CHOLECALCIFEROL (VITAMIN D3) 125 MCG
0 CAPSULE ORAL
Qty: 0 | Refills: 0 | DISCHARGE

## 2020-01-01 RX ORDER — ALBUTEROL 90 UG/1
0 AEROSOL, METERED ORAL
Qty: 0 | Refills: 0 | DISCHARGE

## 2020-01-01 RX ORDER — MEXILETINE HYDROCHLORIDE 150 MG/1
1 CAPSULE ORAL
Qty: 0 | Refills: 0 | DISCHARGE

## 2020-01-01 RX ORDER — FUROSEMIDE 40 MG
1 TABLET ORAL
Qty: 0 | Refills: 0 | DISCHARGE

## 2020-01-01 RX ORDER — ATORVASTATIN CALCIUM 80 MG/1
1 TABLET, FILM COATED ORAL
Qty: 0 | Refills: 0 | DISCHARGE

## 2020-01-01 RX ORDER — ASPIRIN/CALCIUM CARB/MAGNESIUM 324 MG
1 TABLET ORAL
Qty: 0 | Refills: 0 | DISCHARGE

## 2020-01-16 NOTE — REASON FOR VISIT
[Follow-Up - Clinic] : a clinic follow-up of [Heart Failure] : congestive heart failure [Hyperlipidemia] : hyperlipidemia [Coronary Artery Disease] : coronary artery disease [Medication Management] : Medication management [Hypertension] : hypertension [Ventricular Tachycardia] : ventricular tachycardia [Supraventricular Tachycardia] : supraventricular tachycardia

## 2020-01-17 NOTE — REVIEW OF SYSTEMS
[Feeling Fatigued] : feeling fatigued [Joint Pain] : joint pain [Muscle Cramps] : muscle cramps [Joint Stiffness] : joint stiffness [Shortness Of Breath] : no shortness of breath [Dyspnea on exertion] : not dyspnea during exertion [Lower Ext Edema] : no extremity edema [Palpitations] : no palpitations

## 2020-01-17 NOTE — PHYSICAL EXAM
[Well Groomed] : well groomed [General Appearance - Well Nourished] : well nourished [Normal Appearance] : normal appearance [General Appearance - Well Developed] : well developed [Normal Conjunctiva] : the conjunctiva exhibited no abnormalities [General Appearance - In No Acute Distress] : no acute distress [No Deformities] : no deformities [Eyelids - No Xanthelasma] : the eyelids demonstrated no xanthelasmas [Normal Oral Mucosa] : normal oral mucosa [No Oral Cyanosis] : no oral cyanosis [No Oral Pallor] : no oral pallor [Normal Jugular Venous A Waves Present] : normal jugular venous A waves present [Normal Jugular Venous V Waves Present] : normal jugular venous V waves present [No Jugular Venous Luna A Waves] : no jugular venous luna A waves [Respiration, Rhythm And Depth] : normal respiratory rhythm and effort [Heart Sounds] : normal S1 and S2 [Heart Rate And Rhythm] : heart rate and rhythm were normal [Regular-Premature Beats] : the rhythm was regular with premature beats [Murmurs] : no murmurs present [Abdomen Soft] : soft [Abdomen Tenderness] : non-tender [Abnormal Walk] : normal gait [Abdomen Mass (___ Cm)] : no abdominal mass palpated [Nail Clubbing] : no clubbing of the fingernails [Petechial Hemorrhages (___cm)] : no petechial hemorrhages [Cyanosis, Localized] : no localized cyanosis [Skin Color & Pigmentation] : normal skin color and pigmentation [Skin Lesions] : no skin lesions [No Venous Stasis] : no venous stasis [] : no rash [No Skin Ulcers] : no skin ulcer [No Xanthoma] : no  xanthoma was observed [Mood] : the mood was normal [Affect] : the affect was normal [Oriented To Time, Place, And Person] : oriented to person, place, and time [No Anxiety] : not feeling anxious [FreeTextEntry1] : trace edema

## 2020-01-17 NOTE — HISTORY OF PRESENT ILLNESS
[FreeTextEntry1] : Misael is returning for f/u \par \par No CP\par slightly worse BAGLEY\par No palpitations\par No syncope\par No fever\par Edema minimal

## 2020-01-28 NOTE — DATA REVIEWED
[FreeTextEntry1] : CT Scan performed on 10/22/2019 at [facility] revealed\par - Stabel narrowing of the left maintem bronchus and lobar bronchi, which and abruptly further lingula and left lower lobe. \par - complete atelectasis of the left lower lobe and lingular are unchanged\par -Stable 4 mm right lower lobe pulmonary nodule.

## 2020-01-28 NOTE — HISTORY OF PRESENT ILLNESS
[FreeTextEntry1] : Mr. Nash is an 86 year old male, former smoker, with hx of COPD found to have a left hilar mas with upper lobe narrowing and a loculated moderate pleural effusion. He had a bronchoscopy performed by a pulmonologist a few months ago which was inconclusive. The lesion is highly suspicious of carcinoma. I gave him the option of another bronchoscopy or a diagnostic thoracentesis. I also offered a bronchoscopy with a VATS decortication with a pleural biopsy. Patient preferred to proceed with the least invasive method to help decipher a diagnosis.  He underwent a repeat bronchoscopy on 4/16/19 which revealed atypicla cells. He now presents for a follow up visit.  He states he is becoming more short of breath.

## 2020-01-28 NOTE — PHYSICAL EXAM
[Neck Appearance] : the appearance of the neck was normal [] : no respiratory distress [Neck Cervical Mass (___cm)] : no neck mass was observed [Respiration, Rhythm And Depth] : normal respiratory rhythm and effort [Exaggerated Use Of Accessory Muscles For Inspiration] : no accessory muscle use [Examination Of The Chest] : the chest was normal in appearance [Bowel Sounds] : normal bowel sounds [Chest Visual Inspection Thoracic Asymmetry] : no chest asymmetry [Abdomen Soft] : soft [Abdomen Tenderness] : non-tender [Cervical Lymph Nodes Enlarged Posterior Bilaterally] : posterior cervical [Cervical Lymph Nodes Enlarged Anterior Bilaterally] : anterior cervical [Supraclavicular Lymph Nodes Enlarged Bilaterally] : supraclavicular [Cranial Nerves] : cranial nerves 2-12 were intact [Deep Tendon Reflexes (DTR)] : deep tendon reflexes were 2+ and symmetric [Oriented To Time, Place, And Person] : oriented to person, place, and time [Sensation] : the sensory exam was normal to light touch and pinprick

## 2020-01-28 NOTE — ASSESSMENT
[FreeTextEntry1] : Mr. Nash is complaining of more shortness of breath.  He denies chest pain.  I am recommending a left sided thoracentesis for symptomatic relief.  The fluid will be sent off to evaluate for cytology which could aid in providing a diagnosis.  His most recent cat scan of the chest was in October 2019.  He had a moderate left effusion at that time.  He did not want to proceed with drainage at that time.

## 2020-01-28 NOTE — CONSULT LETTER
[Courtesy Letter:] : I had the pleasure of seeing your patient, [unfilled], in my office today. [Dear  ___] : Dear  [unfilled], [Consult Closing:] : Thank you very much for allowing me to participate in the care of this patient.  If you have any questions, please do not hesitate to contact me. [Please see my note below.] : Please see my note below. [Sincerely,] : Sincerely, [FreeTextEntry2] : Simeon Jarquin MD [FreeTextEntry3] : Jett Baker MD\par Director of Thoracic, [facility]\par Cardiovascular & Thoracic Surgery\par \par [facility] \par [address] [place] [address]\[place] [address]

## 2020-02-04 NOTE — ASU PATIENT PROFILE, ADULT - PMH
CAD (coronary artery disease)    DVT, lower extremity  2007, left leg  Dyspnea on exertion    Heart attack  2007  History of COPD    HLD (hyperlipidemia)    HTN (hypertension)    Mass of parotid gland  x 50 years, Right  NSVT (nonsustained ventricular tachycardia)    Paroxysmal atrial fibrillation    Pleural effusion on left    Rheumatoid arthritis    Sinus node dysfunction    SVT (supraventricular tachycardia)

## 2020-02-05 NOTE — BRIEF OPERATIVE NOTE - OPERATION/FINDINGS
300cc dark bloody fluid removed from L side. 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

## 2020-02-05 NOTE — PROVIDER CONTACT NOTE (OTHER) - REASON
spoke with dr blackburn pt tegadem dressing with serosanguinous drainage upon arrival to asu no further change instructed to change drsg to gauze with tegaderm drsg dry and intact

## 2020-04-02 PROBLEM — R06.02 SOB (SHORTNESS OF BREATH): Status: ACTIVE | Noted: 2019-01-15

## 2020-04-02 PROBLEM — R91.8 ABNORMAL CT SCAN, LUNG: Status: ACTIVE | Noted: 2019-01-15

## 2020-04-02 PROBLEM — R06.83 SNORING: Status: ACTIVE | Noted: 2019-01-15

## 2020-04-02 PROBLEM — R09.82 PND (POST-NASAL DRIP): Status: ACTIVE | Noted: 2019-01-15

## 2020-04-02 PROBLEM — J44.9 COPD (CHRONIC OBSTRUCTIVE PULMONARY DISEASE): Status: ACTIVE | Noted: 2019-01-15

## 2020-04-13 NOTE — ASSESSMENT
[FreeTextEntry1] : Ms. Nash is a 87 year old female with a prior history of  longstanding parotid tumor, CAD, s/p CABG (2007), coronary stents, HTN, NSVT, RA, HLD, and former smoker, COPD, and pleural disease who was spoken to over the phone for experiencing SOB - though likely cancer progression \par \par The patient's SOB is felt to be multifactorial:\par - Out-of Shape\par - Poor breathing mechanics\par - Restrictive Dysfunction due to pleural effusion\par - COPD\par - Cardiac Disease \par \par Problem 1: COPD (Improved)\par - Continue Trelegy 1 inhalation QD\par - Continue Ventolin 2 inhalations q6H\par - Add Albuterol (0.83) by nebulizer QID  (gargle and rinse after use)\par -recommended to use Acapella device\par -COPD is a progressive disease and although it can’t be cured , appropriate management can slow its progression, reduce frequency and severity of exacerbations, and improve symptoms and the patient quality of life. Hospitalizations are the greatest contributor to the total COPD costs and account for up to 87% of total COPD related costs. Exacerbations are the main cause of admissions and subsequently account for the 40-75% of COPD costs. Inhaled maintenance therapy reduces the incidence of exacerbations in patients with stable COPD. Incorrect inhaler use and nonadherence are major obstacles to achieving COPD treatment goals. Many COPD patients have challenges (impaired inhalation, limited dexterity, reduced cognition: that limit their ability to correctly use their COPD treatment devices resulting in reduced symptom control. Of most importance is smoking cessation and early intervention with respiratory illnesses and contemplation for pulmonary rehab to enhance quality of life.\par -Inhaler technique reviewed as well as oral hygiene techniques reviewed with patient. Avoidance of cold air, extremes of temperature, rescue inhaler should be used before exercise. Order of medication reviewed with patient. Recommended use of a cool mist humidifier in the bedroom. \par \par Problem 2: Abnormal PET/CT/ CXR (Left Lower Lung "Tumor" and Pleural Effusion) -  ( refused work up )\par - Follow up CT scan of the chest with contrast 10/19 (Recommended CT of chest 4/2/2020)\par - Following scan, he had a CTS evaluation for biopsy of left lower lung abnormality and effusion with Dr. Baker (pt reluctant to proceed)\par - S/p Early detection CDT lung testing: Negative\par -Reassess exercise limitation caused by breathlessness and fatigue and also provide a supportive environment in which patients can become active and engage in management of their health problems \par - Unclear as to how long  pleural fluid has been present and whether it will remain as it is. He will need to be observed and followed up with a CT scan 10/2019. He will need cardiac evaluation. \par \par Problem 2A: Pleural Effusion (refused work up)\par - Discussed with the pt and his family the benefits of undergoing a biopsy of his pleural effusion. They were provided a in-depth explanation of how the procedure is performed as well as the need for it. He was ultimately recommended to have a consultation with a radiologist, who would be performing the procedure. \par \par Problem 3: Allergy/ PND\par - Continue Astelin Nasal Spray 0.15 1 sniff up to BID\par - Continue Clarinex 5 mg QHS\par Environmental measures for allergies were encouraged including mattress and pillow cover, air purifier, and environmental controls.\par \par Problem 4: ?OSAS\par - Due to his fatigue, snoring, elevated Mallampati class, cardiac issues, and witnessed apneic episodes, he is being recommended for a home sleep study. \par - Sleep apnea is associated with adverse clinical consequences which an affect most organ systems. Cardiovascular disease risk includes arrhythmias, atrial fibrillation, hypertension, coronary artery disease, and stroke. Metabolic disorders include diabetes type 2, non-alcoholic fatty liver disease. Mood disorder especially depression; and cognitive decline especially in the elderly. Associations with chronic reflux/Winchester’s esophagus some but not all inclusive. \par -Reasons include arousal consistent with hypopnea; respiratory events most prominent in REM sleep or supine position; therefore sleep staging and body position are important for accurate diagnosis and estimation of AHI. \par \par Problem 5: Cardiac Disease\par - Follow up with Dr. Niko Soto.\par \par Problem 6: Poor Breathing Mechanics \par - Proper breathing techniques were reviewed with an emphasis of exhalation. Patient instructed to breath in for 1 second and out for four seconds. Patient was encouraged to not talk while walking.\par \par Problem 7: Health maintenance \par - explained to patients daughter that without in person evaluation and no tissue /imaging  insurance may not provide oxygen. \par - Recommended CT of the chest\par -s/p flu shot \par - Recommended [facility] pulmonary rehab\par -recommended strep pneumonia vaccines: Prevnar-13 vaccine, followed by Pneumo vaccine 23 one year following\par -recommended early intervention for URIs\par -recommended regular osteoporosis evaluations\par -recommended early dermatological evaluations\par -recommended after the age of 50 to the age of 70, colonoscopy every 5 years\par \par \par f/u in 4 months with spi and DLCO\par pt is encouraged to call or fax the office with any questions or concerns. \par Explained to the pt in full detail with demonstrations how to use the inhalers and inhaler hygiene. \par -Education provided to the PT regarding their visit and conditions.

## 2020-04-13 NOTE — HISTORY OF PRESENT ILLNESS
[Home] : at home, [unfilled] , at the time of the visit. [Medical Office: ([facility])___] : at ~his/her~ medical office located in [place] [Family Member] : family member [Patient] : the patient [Self] : self [FreeTextEntry2] : RONALDO CAVANAUGH [FreeTextEntry1] : Ms. Nash is a 87 year old female with a history of abnormal CT, COPD, BAGLEY, left pleural effusion, PND, snoring, and SOB who was spoken to today via phone call for follow up visit. Her chief complaint is\par - Family member notes he seems to be struggling and having difficulty breathing\par - He has become less active , eating and drinking less \par - Family members are concerned about his health and would like him to receive some oxygen. \par - He had the second test done, he does not want any further invasive tx at this point. \par -he denies any headaches, nausea, vomiting, fever, chills, sweats, chest pain, chest pressure, diarrhea, constipation, dysphagia, dizziness, sour taste in the mouth, leg swelling, leg pain, itchy eyes, itchy ears, heartburn, reflux, myalgias or arthralgias.\par

## 2020-04-13 NOTE — END OF VISIT
[Time Spent: ___ minutes] : I have spent [unfilled] minutes of face to face time with the patient [FreeTextEntry3] : Pt was spoken to over the phone for 15 minutes and consent was obtained verbally.

## 2020-04-13 NOTE — ADDENDUM
[FreeTextEntry1] : ***************************Amended by Itz Almaraz 04/13/2020******************************************\par \par Documented by Freya Carvalho acting as a scribe for Dr. Simeon Jarquin on 04/02/2020.\par \par All medical record entries made by the Scribe were at my, Dr. Simeon Jarquin's, direction and personally dictated by me on 04/02/2020. I have reviewed the chart and agree that the record accurately reflects my personal performance of the history, physical exam, assessment and plan. I have also personally directed, reviewed, and agree with the discharge instructions.\par

## 2020-04-21 ENCOUNTER — APPOINTMENT (OUTPATIENT)
Dept: PULMONOLOGY | Facility: CLINIC | Age: 85
End: 2020-04-21

## 2020-08-20 PROBLEM — I49.5 SINUS NODE DYSFUNCTION: Status: ACTIVE | Noted: 2017-07-25

## 2023-05-04 NOTE — HISTORY OF PRESENT ILLNESS
Pt sched for nurse BP check.--lp   [FreeTextEntry1] : Mr. Nash is an 86 year old male, former smoker, with hx of COPD found to have a left hilar mas with upper lobe narrowing and a loculated moderate pleural effusion. He had a bronchoscopy performed by a pulmonologist a few months ago which was inconclusive. The lesion is highly suspicious of carcinoma. I gave him the option of another bronchoscopy or a diagnostic thoracentesis. I also offered a bronchoscopy with a VATS decortication with a pleural biopsy. Patient preferred to proceed with the least invasive method to help decipher a diagnosis.  He underwent a repeat bronchoscopy on 4/16/19 which revealed _________atypicla cells_____. He now presents for routine follow up.

## 2024-06-03 NOTE — ASU PATIENT PROFILE, ADULT - NUMBER OF YRS
3:40 pm  See [employer] messaging w/ pt  Pts report of elevated bp's   Noted pt was seen in clinic by Dr. Lai 5/24/24 at which time he made medication changes    With the nature of these elevated bp's felt it best to speak directly w/ pt  Just placed call to pt , MARGARET GALLEGOS w/ my name, w/ Dr. [facility] , noted he just saw Dr. Lai in clinic on 5/24/24 at which time he made medication changes,asked if he made these changes ( I see per his message he had but wanted to explore this more and ask about other medicines), also if he is showing signs of fluid w/ wt gain and/or swelling etc. Left day, date, time and this office call back number and asked pt to return my call    I also replied to pt in [employer] w/ the following message just now:   Good afternoon Mr. Acosta     I just placed a phone call to you due to the nature of your message and needing to have back and forth dialogue     Please call me @ [phone]     Sincerely,   Coco      4:25 pm: Pt returned my call - transferred him directly to me   Confirmed bp's as he documented   Pt is taking Metoprolol 100 mg once daily at bedtime and stopped coreg  HF assessment:   Is not weighing -is at a hotel for a conferend  And admittedly is eating foods high in sodiuy  Swelling to legs to ankles, no abdominal distention , so sob or heavy breathing and walking around a lot at the hotel , sleeping fine on 1 pillow   Had a strange feeling, but hard to explain.   Said may 16 same thing occurred w/ high blood pressure went to local ED and all ok      Medications:  Is taking Metoprolol 10 mg every night, and entresto, Jardiance and Aldactone as prescribed   Lasix: pt told me:He's taking Lasix 20 mg once a day and said he has not been taking as prescribed due to travel and having to urinate frequently.  Told pt this could be the problem in this case Pt took a dose today and said prior to this was about 4-5 days ago Pt said he now understands the significance of this  after I explained   Pt said he is actually at a training for a new job, they are very strict is being present, but he will discuss w/ who he needs to and explain he will need to leave periodically     While pt on hold  I called and spoke w/ Dr. Lai about this situation   We talked about pts report of bps: 150-17-/ moste   HR's upper to mid 70's, of his symptoms ( feeling off at times) , Dr. Lai very well recalled this pt from [facility] 5/24/24 cliinic visit , pts report of syncopal episode . We reviewed his meds and lack of taking Lasix while eating hotel/conference food   TORB: Dr. Bass/Salome RN: pt to take Lasix 20 mg once every day as prescribed  Said he prefers to make one med change at a time due to concern pt may have trouble    I instructed pt to do as above  NO extra, just the 20 mg once daily   Also instructed pt to do his best with the salt: suggested and reminded pt of high sodium foods to avoid: salad dressings, sauces, ketchup and certain foods  Also reminded pt to watch his liquid intake and to have no more than 1 1/2 liters of liquids a day  Pt voiced understanding and agreement to all  Additionally, asked pt to notify this office for if bps not better in a couple of days and to seek medical attention if bps remain elevated        30